# Patient Record
Sex: MALE | Race: WHITE | NOT HISPANIC OR LATINO | Employment: FULL TIME | URBAN - METROPOLITAN AREA
[De-identification: names, ages, dates, MRNs, and addresses within clinical notes are randomized per-mention and may not be internally consistent; named-entity substitution may affect disease eponyms.]

---

## 2023-05-07 DIAGNOSIS — N52.9 ERECTILE DYSFUNCTION, UNSPECIFIED ERECTILE DYSFUNCTION TYPE: Primary | ICD-10-CM

## 2023-05-08 ENCOUNTER — TELEPHONE (OUTPATIENT)
Dept: PRIMARY CARE | Facility: CLINIC | Age: 63
End: 2023-05-08
Payer: COMMERCIAL

## 2023-05-08 RX ORDER — SILDENAFIL 50 MG/1
TABLET, FILM COATED ORAL
Qty: 18 TABLET | Refills: 0 | Status: SHIPPED | OUTPATIENT
Start: 2023-05-08 | End: 2023-07-19 | Stop reason: ALTCHOICE

## 2023-05-08 NOTE — TELEPHONE ENCOUNTER
5/8/23 called left message to call back to setup appt (3m fu) before more meds can be given out.  
5/8/23 pt called back scheduled for CPE + 90 day med fu on 7/19/23 @ 10:00am with Allyson  
Simple: Patient demonstrates quick and easy understanding

## 2023-05-24 ENCOUNTER — HOSPITAL ENCOUNTER (OUTPATIENT)
Dept: DATA CONVERSION | Facility: HOSPITAL | Age: 63
End: 2023-05-24
Attending: SURGERY | Admitting: SURGERY
Payer: COMMERCIAL

## 2023-05-24 DIAGNOSIS — K62.82 DYSPLASIA OF ANUS: ICD-10-CM

## 2023-05-24 DIAGNOSIS — I10 ESSENTIAL (PRIMARY) HYPERTENSION: ICD-10-CM

## 2023-05-24 DIAGNOSIS — I25.10 ATHEROSCLEROTIC HEART DISEASE OF NATIVE CORONARY ARTERY WITHOUT ANGINA PECTORIS: ICD-10-CM

## 2023-05-24 DIAGNOSIS — M54.50 LOW BACK PAIN, UNSPECIFIED: ICD-10-CM

## 2023-05-24 DIAGNOSIS — H54.7 UNSPECIFIED VISUAL LOSS: ICD-10-CM

## 2023-05-24 DIAGNOSIS — D62 ACUTE POSTHEMORRHAGIC ANEMIA: ICD-10-CM

## 2023-05-24 DIAGNOSIS — Z95.3 PRESENCE OF XENOGENIC HEART VALVE: ICD-10-CM

## 2023-05-24 DIAGNOSIS — D68.9 COAGULATION DEFECT, UNSPECIFIED (MULTI): ICD-10-CM

## 2023-05-24 DIAGNOSIS — Z87.891 PERSONAL HISTORY OF NICOTINE DEPENDENCE: ICD-10-CM

## 2023-05-24 DIAGNOSIS — D69.6 THROMBOCYTOPENIA, UNSPECIFIED (CMS-HCC): ICD-10-CM

## 2023-05-24 DIAGNOSIS — E78.5 HYPERLIPIDEMIA, UNSPECIFIED: ICD-10-CM

## 2023-05-24 DIAGNOSIS — R91.8 OTHER NONSPECIFIC ABNORMAL FINDING OF LUNG FIELD: ICD-10-CM

## 2023-05-24 DIAGNOSIS — I31.39 OTHER PERICARDIAL EFFUSION (NONINFLAMMATORY) (HHS-HCC): ICD-10-CM

## 2023-05-24 DIAGNOSIS — D64.9 ANEMIA, UNSPECIFIED: ICD-10-CM

## 2023-05-24 DIAGNOSIS — G89.29 OTHER CHRONIC PAIN: ICD-10-CM

## 2023-05-24 DIAGNOSIS — K76.89 OTHER SPECIFIED DISEASES OF LIVER: ICD-10-CM

## 2023-05-24 DIAGNOSIS — Q23.1 CONGENITAL INSUFFICIENCY OF AORTIC VALVE (HHS-HCC): ICD-10-CM

## 2023-05-24 DIAGNOSIS — K21.9 GASTRO-ESOPHAGEAL REFLUX DISEASE WITHOUT ESOPHAGITIS: ICD-10-CM

## 2023-06-21 LAB
COMPLETE PATHOLOGY REPORT: NORMAL
CONVERTED CLINICAL DIAGNOSIS-HISTORY: NORMAL
CONVERTED FINAL DIAGNOSIS: NORMAL
CONVERTED FINAL REPORT PDF LINK TO COPY AND PASTE: NORMAL
CONVERTED GROSS DESCRIPTION: NORMAL

## 2023-06-28 PROBLEM — R74.8 ELEVATED LIVER ENZYMES: Status: ACTIVE | Noted: 2023-06-28

## 2023-06-28 PROBLEM — I47.10 SUPRAVENTRICULAR TACHYCARDIA (CMS-HCC): Status: ACTIVE | Noted: 2023-06-28

## 2023-06-28 PROBLEM — Z95.828 S/P ASCENDING AORTIC REPLACEMENT: Status: ACTIVE | Noted: 2023-06-28

## 2023-06-28 PROBLEM — K62.9 ANAL LESION: Status: ACTIVE | Noted: 2023-06-28

## 2023-06-28 PROBLEM — K63.5 COLON POLYPS: Status: ACTIVE | Noted: 2023-06-28

## 2023-06-28 PROBLEM — Z95.2 S/P AVR (AORTIC VALVE REPLACEMENT): Status: ACTIVE | Noted: 2023-06-28

## 2023-06-28 PROBLEM — N52.9 ED (ERECTILE DYSFUNCTION): Status: ACTIVE | Noted: 2023-06-28

## 2023-06-28 PROBLEM — K21.9 CHRONIC GERD: Status: ACTIVE | Noted: 2023-06-28

## 2023-06-28 PROBLEM — K59.00 CONSTIPATION: Status: ACTIVE | Noted: 2023-06-28

## 2023-06-28 PROBLEM — R53.83 FATIGUE: Status: ACTIVE | Noted: 2023-06-28

## 2023-06-28 PROBLEM — K76.89 LIVER NODULE: Status: ACTIVE | Noted: 2023-06-28

## 2023-06-28 PROBLEM — R91.8 PULMONARY NODULES: Status: ACTIVE | Noted: 2023-06-28

## 2023-06-28 PROBLEM — R09.82 PND (POST-NASAL DRIP): Status: ACTIVE | Noted: 2023-06-28

## 2023-06-28 PROBLEM — E78.5 DYSLIPIDEMIA: Status: ACTIVE | Noted: 2023-06-28

## 2023-06-28 PROBLEM — M77.52 TENDINITIS OF LEFT FOOT: Status: ACTIVE | Noted: 2023-06-28

## 2023-06-28 PROBLEM — E78.5 HYPERLIPIDEMIA: Status: ACTIVE | Noted: 2023-06-28

## 2023-06-28 PROBLEM — D64.9 ANEMIA: Status: ACTIVE | Noted: 2023-06-28

## 2023-06-28 PROBLEM — G89.18 POST-OP PAIN: Status: ACTIVE | Noted: 2023-06-28

## 2023-06-28 PROBLEM — I10 ESSENTIAL HYPERTENSION: Status: ACTIVE | Noted: 2023-06-28

## 2023-06-28 PROBLEM — R05.8 RECURRENT NON-PRODUCTIVE COUGH: Status: ACTIVE | Noted: 2023-06-28

## 2023-06-28 PROBLEM — M79.672 LEFT FOOT PAIN: Status: ACTIVE | Noted: 2023-06-28

## 2023-06-28 PROBLEM — D17.9 LIPOMA: Status: ACTIVE | Noted: 2023-06-28

## 2023-07-18 ASSESSMENT — PROMIS GLOBAL HEALTH SCALE
RATE_PHYSICAL_HEALTH: EXCELLENT
CARRYOUT_PHYSICAL_ACTIVITIES: COMPLETELY
RATE_AVERAGE_PAIN: 1
RATE_SOCIAL_SATISFACTION: EXCELLENT
CARRYOUT_SOCIAL_ACTIVITIES: EXCELLENT
RATE_MENTAL_HEALTH: EXCELLENT
RATE_QUALITY_OF_LIFE: EXCELLENT
RATE_GENERAL_HEALTH: VERY GOOD
EMOTIONAL_PROBLEMS: NEVER

## 2023-07-19 ENCOUNTER — OFFICE VISIT (OUTPATIENT)
Dept: PRIMARY CARE | Facility: CLINIC | Age: 63
End: 2023-07-19
Payer: COMMERCIAL

## 2023-07-19 VITALS
HEART RATE: 51 BPM | DIASTOLIC BLOOD PRESSURE: 78 MMHG | OXYGEN SATURATION: 98 % | WEIGHT: 155 LBS | BODY MASS INDEX: 24.91 KG/M2 | TEMPERATURE: 97.1 F | RESPIRATION RATE: 15 BRPM | SYSTOLIC BLOOD PRESSURE: 138 MMHG | HEIGHT: 66 IN

## 2023-07-19 DIAGNOSIS — E78.5 HYPERLIPIDEMIA, UNSPECIFIED HYPERLIPIDEMIA TYPE: ICD-10-CM

## 2023-07-19 DIAGNOSIS — Z95.828 S/P ASCENDING AORTIC REPLACEMENT: Chronic | ICD-10-CM

## 2023-07-19 DIAGNOSIS — I10 ESSENTIAL HYPERTENSION: Chronic | ICD-10-CM

## 2023-07-19 DIAGNOSIS — R91.8 PULMONARY NODULES: ICD-10-CM

## 2023-07-19 DIAGNOSIS — N52.9 ERECTILE DYSFUNCTION, UNSPECIFIED ERECTILE DYSFUNCTION TYPE: Primary | ICD-10-CM

## 2023-07-19 DIAGNOSIS — R85.613: ICD-10-CM

## 2023-07-19 DIAGNOSIS — Z95.2 S/P AVR (AORTIC VALVE REPLACEMENT): ICD-10-CM

## 2023-07-19 PROBLEM — R74.8 ELEVATED LIVER ENZYMES: Status: RESOLVED | Noted: 2023-06-28 | Resolved: 2023-07-19

## 2023-07-19 PROBLEM — K21.9 CHRONIC GERD: Status: RESOLVED | Noted: 2023-06-28 | Resolved: 2023-07-19

## 2023-07-19 PROBLEM — D64.9 ANEMIA: Status: RESOLVED | Noted: 2023-06-28 | Resolved: 2023-07-19

## 2023-07-19 PROBLEM — K59.00 CONSTIPATION: Status: RESOLVED | Noted: 2023-06-28 | Resolved: 2023-07-19

## 2023-07-19 PROCEDURE — 3078F DIAST BP <80 MM HG: CPT | Performed by: NURSE PRACTITIONER

## 2023-07-19 PROCEDURE — 1036F TOBACCO NON-USER: CPT | Performed by: NURSE PRACTITIONER

## 2023-07-19 PROCEDURE — 99214 OFFICE O/P EST MOD 30 MIN: CPT | Performed by: NURSE PRACTITIONER

## 2023-07-19 PROCEDURE — 3075F SYST BP GE 130 - 139MM HG: CPT | Performed by: NURSE PRACTITIONER

## 2023-07-19 PROCEDURE — 3008F BODY MASS INDEX DOCD: CPT | Performed by: NURSE PRACTITIONER

## 2023-07-19 RX ORDER — PRAVASTATIN SODIUM 20 MG/1
1 TABLET ORAL DAILY
COMMUNITY
Start: 2022-03-22 | End: 2024-01-19 | Stop reason: SDUPTHER

## 2023-07-19 RX ORDER — BETAMETHASONE DIPROPIONATE 0.5 MG/G
LOTION TOPICAL
COMMUNITY
Start: 2021-10-19

## 2023-07-19 RX ORDER — AMLODIPINE BESYLATE 2.5 MG/1
1 TABLET ORAL DAILY
COMMUNITY
Start: 2021-04-16 | End: 2024-04-05

## 2023-07-19 RX ORDER — SILDENAFIL 100 MG/1
100 TABLET, FILM COATED ORAL AS NEEDED
Qty: 18 TABLET | Refills: 1 | Status: SHIPPED | OUTPATIENT
Start: 2023-07-19 | End: 2024-05-06 | Stop reason: ALTCHOICE

## 2023-07-19 RX ORDER — DOCUSATE SODIUM 100 MG/1
CAPSULE, LIQUID FILLED ORAL 2 TIMES DAILY
COMMUNITY
Start: 2020-02-09 | End: 2023-07-19 | Stop reason: ALTCHOICE

## 2023-07-19 RX ORDER — ATENOLOL 25 MG/1
0.5 TABLET ORAL DAILY
COMMUNITY
Start: 2020-03-04 | End: 2024-04-17

## 2023-07-19 RX ORDER — CHOLECALCIFEROL (VITAMIN D3) 25 MCG
TABLET ORAL
COMMUNITY
Start: 2012-09-07 | End: 2024-01-04 | Stop reason: ALTCHOICE

## 2023-07-19 RX ORDER — SILDENAFIL 50 MG/1
TABLET, FILM COATED ORAL
COMMUNITY
Start: 2020-08-27 | End: 2023-07-19 | Stop reason: SDUPTHER

## 2023-07-19 RX ORDER — LOSARTAN POTASSIUM 25 MG/1
1 TABLET ORAL DAILY
COMMUNITY
Start: 2022-02-11 | End: 2024-05-06 | Stop reason: SDUPTHER

## 2023-07-19 ASSESSMENT — PATIENT HEALTH QUESTIONNAIRE - PHQ9
SUM OF ALL RESPONSES TO PHQ9 QUESTIONS 1 AND 2: 0
2. FEELING DOWN, DEPRESSED OR HOPELESS: NOT AT ALL
1. LITTLE INTEREST OR PLEASURE IN DOING THINGS: NOT AT ALL

## 2023-07-19 ASSESSMENT — PAIN SCALES - GENERAL: PAINLEVEL: 0-NO PAIN

## 2023-07-19 ASSESSMENT — ENCOUNTER SYMPTOMS
OCCASIONAL FEELINGS OF UNSTEADINESS: 0
LOSS OF SENSATION IN FEET: 0
DEPRESSION: 0

## 2023-07-19 ASSESSMENT — COLUMBIA-SUICIDE SEVERITY RATING SCALE - C-SSRS
6. HAVE YOU EVER DONE ANYTHING, STARTED TO DO ANYTHING, OR PREPARED TO DO ANYTHING TO END YOUR LIFE?: NO
2. HAVE YOU ACTUALLY HAD ANY THOUGHTS OF KILLING YOURSELF?: NO
1. IN THE PAST MONTH, HAVE YOU WISHED YOU WERE DEAD OR WISHED YOU COULD GO TO SLEEP AND NOT WAKE UP?: NO

## 2023-07-19 NOTE — ASSESSMENT & PLAN NOTE
controlled. on Losartan, amlodipine and atenolol.   -atenolol cut in half. Bradycardia improved on lower dose.

## 2023-07-19 NOTE — PROGRESS NOTES
"Subjective   Patient ID: Osvaldo Coleman is a 63 y.o. male who presents for No chief complaint on file..    Patient of Dr. Marino.    Had rectal biopsy in May for dysplasia noted on colonoscopy. Biopsy showed HGSIL (AIN-2).   Has upcoming appointment next  with colo-rectal Dr. Flanagan.     Has been checking Bps at home. In the 120-130s/80s           Review of Systems  otherwise negative aside from what was mentioned above in HPI.    Objective   /78 (BP Location: Left arm, Patient Position: Sitting)   Pulse 51   Temp 36.2 °C (97.1 °F)   Resp 15   Ht 1.664 m (5' 5.5\")   Wt 70.3 kg (155 lb)   SpO2 98%   BMI 25.40 kg/m²     Physical Exam    Assessment/Plan   Problem List Items Addressed This Visit          Cardiac and Vasculature    Essential hypertension (Chronic)     controlled. on Losartan, amlodipine and atenolol.   -atenolol cut in half. Bradycardia improved on lower dose.         Hyperlipidemia (Chronic)     Per cardiology.  On pravastatin         S/P ascending aortic replacement (Chronic)     Per cardiology         S/P AVR (aortic valve replacement)       Genitourinary and Reproductive    ED (erectile dysfunction) - Primary (Chronic)     50 mg not helping. 75 mg was working but was not covered to get 50 and 25 mg tabs.   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 Flanagan in colorectal surgery.             Pulmonary and Pneumonias    Pulmonary nodules     Stable; desires annual CT chest; ordered         Relevant Orders    CT chest wo IV contrast           f/u 6 months. Labs     Health Maintenance  -Prostate Cancer Screenin/22: 2.79,   -Vaccinations: UTD influenza. Tdap 19. Shingrix #2 UTD. Pneumovax UTD  -Lung Cancer Screening: never smoker  -Colonoscopy: 3/23--repeat 3 years   "

## 2023-07-19 NOTE — ASSESSMENT & PLAN NOTE
50 mg not helping. 75 mg was working but was not covered to get 50 and 25 mg tabs.   Increase to 100 mg as needed

## 2023-09-07 VITALS
TEMPERATURE: 97.5 F | WEIGHT: 154.98 LBS | RESPIRATION RATE: 18 BRPM | HEIGHT: 67 IN | DIASTOLIC BLOOD PRESSURE: 76 MMHG | BODY MASS INDEX: 24.33 KG/M2 | SYSTOLIC BLOOD PRESSURE: 160 MMHG | HEART RATE: 61 BPM

## 2023-09-30 NOTE — H&P
History of Present Illness:   History Present Illness:  Reason for surgery: Anal dysplasia   HPI:    63M with recent scope showing dysplastic lesions at the anal canal    Allergies:        Allergies:  ·  acetaminophen-chlorpheniramine : Unknown       Intolerances:  ·  Coricidin : Tremor/Shaking    Home Medication Review:   Home Medications Reviewed: yes     Impression/Procedure:   ·  Impression and Planned Procedure: Proceed to OR       ERAS (Enhanced Recovery After Surgery):  ·  ERAS Patient: no       Vital Signs:  Temperature C: 36.4 degrees C   Temperature F: 97.5 degrees F   Heart Rate: 61 beats per minute   Respiratory Rate: 18 breath per minute   Blood Pressure Systolic: 160 mm/Hg   Blood Pressure Diastolic: 76 mm/Hg     Physical Exam by System:    Respiratory/Thorax: Breathing comfortably on room  air   Cardiovascular: Good radial pulses bilaterally, normal  heart rate     Consent:   COVID-19 Consent:  ·  COVID-19 Risk Consent Surgeon has reviewed key risks related to the risk of lynda COVID-19 and if they contract COVID-19 what the risks are.     Attestation:   Note Completion:  I am a:  Resident/Fellow   Attending Attestation I saw and evaluated the patient.  I personally obtained the key and critical portions of the history and physical exam or was physically present for key and  critical portions performed by the resident/fellow. I reviewed the resident/fellow?s documentation and discussed the patient with the resident/fellow.  I agree with the resident/fellow?s medical decision making as documented in the note.     I personally evaluated the patient on 24-May-2023         Electronic Signatures:  Rosa Maria Pozo (Fellow))  (Signed 24-May-2023 08:21)   Authored: History of Present Illness, Allergies, Home  Medication Review, Impression/Procedure, ERAS, Physical Exam, Consent, Note Completion  Sheila Mccain)  (Signed 24-May-2023 09:09)   Authored: Physical Exam, Note  Completion   Co-Signer: History of Present Illness, Allergies, Home Medication Review, Impression/Procedure, ERAS, Physical Exam, Consent, Note Completion      Last Updated: 24-May-2023 09:09 by Sheila Mccain)

## 2023-10-02 NOTE — OP NOTE
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  high resolution anoscopy excision with biopsies  and fulguration of condyloma    Anesthesia: Harpal Goff  Estimated Blood Loss: 0  Findings: condyloma in all quadrants  Specimens(s) Collected: yes,           Operative Report:   Procedure:   Informed consent was obtained including discussion of risks, benefits, and alternatives. The patient was brought to the operating room and placed supine. Sequential compression devices were placed. Huddle was completed in accordance with hospital procedures.  Anesthesia was induced and LMA was placed. The patient was placed in lithotomy with all pressure points padded. The area was prepped and draped in the usual fashion. Time out was completed. Digital rectal exam was performed. Pudendal nerve block was performed  with 1/4% marcaine with epinephrine. Intersphincteric block  was also performed. A total of 30cc of local anesthesia was utilized.    Externally, the perineum was normal.  Lighted Hill-Estrada anoscope was introduced. The anal canal was examined sequentially. The canal itself was divided into octants and in each octant, the distal rectum, dentate, anal canal, and anal verge was examined.  Magnification, acetic acid, and Lugol's were utilized to improve visualization. Areas of abnormality were biopsied and destroyed. There was a 3-4mm cluster in the posterior midline and small 1-2mm lesions in all quadrants.     The area was checked for hemostasis and found to be adequate. All visible lesions were addressed.     The patient was then returned to supine. Anesthesia was weaned. The patient was transferred to PACU awake and in stable conditions. Counts were  reported correct at the end of the procedure. I was present and scrubbed throughout.                            Electronic Signatures:  Sheila Mccain)  (Signed 24-May-2023 09:17)   Authored: Post-Operative Note, Chart Review, Note Completion      Last Updated: 24-May-2023 09:17 by Sheila Mccain)

## 2023-11-20 NOTE — PROGRESS NOTES
"No chief complaint on file.      History Of Present Illness    Subjective   Merlin Coleman \"Gene\" is here for follow up of anal dysplasia. He has not has not noticed any new lesions or changes.     HIV status: negative  Smoking status: never a smoker    Previous High Resolution Anoscopies and Cytologies have shown:     [facility] to IC valve. Diverticulosis in the sigmoid colon. Dysplastic mucosa in the distal rectum. The examination was otherwise normal on direct and retroflexion views. No specimens collected. Repeat in 3 years    5/2023 HRA: HSIL     Past Medical History  He  Past Medical History:   Diagnosis Date    Anemia 06/28/2023    Aneurysm of the ascending aorta, without rupture (CMS/HCC) 02/14/2020    Ascending aortic aneurysm    Chronic GERD 06/28/2023    Constipation 06/28/2023    Elevated liver enzymes 06/28/2023    Other pericardial effusion (noninflammatory) 01/15/2021    Pericardial effusion    Other specified abnormal findings of blood chemistry 09/30/2020    Abnormal LFTs    Personal history of (corrected) congenital malformations of heart and circulatory system 01/07/2020    History of congenital aortic insufficiency    Personal history of (corrected) congenital malformations of heart and circulatory system 01/27/2017    History of bicuspid heart valve    Personal history of (corrected) congenital malformations of heart and circulatory system 01/07/2020    History of bicuspid aortic valve    Personal history of other diseases of the circulatory system 03/25/2020    History of aortic valve stenosis    Postcardiotomy syndrome 05/06/2020    Postpericardiotomy syndrome    Thoracic aortic aneurysm, without rupture, unspecified (CMS/HCC) 01/07/2020    Aneurysm, aorta, thoracic       Surgical History  He  Past Surgical History:   Procedure Laterality Date    OTHER SURGICAL HISTORY  05/31/2019    Colonoscopy    OTHER SURGICAL HISTORY  05/31/2019    Lipoma excision    " OTHER SURGICAL HISTORY  02/21/2020    Thoracic aortic aneurysm repair        Social History  He reports that he has never smoked. He has never used smokeless tobacco. He reports current alcohol use of about 7.0 standard drinks of alcohol per week. He reports that he does not use drugs.    Family History  No family history on file.     Allergies  Chlorpheniramine-acetaminophen and Coricidin    Home Medications  Prior to Admission medications    Medication Sig Start Date End Date Taking? Authorizing Provider   amLODIPine (Norvasc) 2.5 mg tablet Take 1 tablet (2.5 mg) by mouth once daily. 4/16/21   Historical Provider, MD   atenolol (Tenormin) 25 mg tablet Take 0.5 tablets (12.5 mg) by mouth once daily. 3/4/20   Historical Provider, MD   betamethasone dipropionate (Diprosone) 0.05 % lotion APPLY PEA SIZED AMOUNT TOPICALLY TO THE AFFECTED AREA EVERY DAY 10/19/21   Historical Provider, MD   cholecalciferol (Vitamin D-3) 25 MCG (1000 UT) tablet Take by mouth. 9/7/12   Historical Provider, MD   fish oil concentrate (Omega-3) 120-180 mg capsule Take 1 tablet by mouth once daily. 9/7/12   Historical Provider, MD   losartan (Cozaar) 25 mg tablet Take 1 tablet (25 mg) by mouth once daily. 2/11/22   Historical Provider, MD   pravastatin (Pravachol) 20 mg tablet Take 1 tablet (20 mg) by mouth once daily. 3/22/22   Historical Provider, MD   sildenafil (Viagra) 100 mg tablet Take 1 tablet (100 mg) by mouth if needed for erectile dysfunction. 7/19/23 10/17/23  RAFFI March-CNP        Physical Exam    Perineal/CARLOS:  Perineum: normal  CARLOS: normal  Tone: normal    Anoscopy    Date/Time: 12/5/2023 3:07 PM    Performed by: Sheila Mccain MD  Authorized by: Sheila Mccain MD    Consent:     Consent obtained:  Verbal    Consent given by:  Patient  Comments:      No dysplasia          Last Recorded Vitals  There were no vitals taken for this visit.      Assessment and Plan  63 y.o. male here for follow up  of anal dysplasia.     Depending on anal cytology results may recommend HRA. If cytology normal would repeat in 6 months.     I emphasized the significant possibility of recurrence and the need for close follow-up. We discussed barrier protection when sexually active, non-smoking, and taking HIV medications.

## 2023-12-05 ENCOUNTER — OFFICE VISIT (OUTPATIENT)
Dept: SURGERY | Facility: CLINIC | Age: 63
End: 2023-12-05
Payer: COMMERCIAL

## 2023-12-05 VITALS
HEART RATE: 58 BPM | WEIGHT: 157 LBS | DIASTOLIC BLOOD PRESSURE: 82 MMHG | BODY MASS INDEX: 25.73 KG/M2 | SYSTOLIC BLOOD PRESSURE: 138 MMHG

## 2023-12-05 DIAGNOSIS — K62.9 ANAL LESION: Primary | ICD-10-CM

## 2023-12-05 PROCEDURE — 3008F BODY MASS INDEX DOCD: CPT | Performed by: SURGERY

## 2023-12-05 PROCEDURE — 3079F DIAST BP 80-89 MM HG: CPT | Performed by: SURGERY

## 2023-12-05 PROCEDURE — 46600 DIAGNOSTIC ANOSCOPY SPX: CPT | Performed by: SURGERY

## 2023-12-05 PROCEDURE — 1036F TOBACCO NON-USER: CPT | Performed by: SURGERY

## 2023-12-05 PROCEDURE — 99213 OFFICE O/P EST LOW 20 MIN: CPT | Performed by: SURGERY

## 2023-12-05 PROCEDURE — 3075F SYST BP GE 130 - 139MM HG: CPT | Performed by: SURGERY

## 2023-12-05 RX ORDER — HYDROCORTISONE 25 MG/G
CREAM TOPICAL
COMMUNITY
Start: 2022-10-18 | End: 2024-01-19 | Stop reason: ALTCHOICE

## 2023-12-20 ENCOUNTER — APPOINTMENT (OUTPATIENT)
Dept: RADIOLOGY | Facility: CLINIC | Age: 63
End: 2023-12-20
Payer: COMMERCIAL

## 2023-12-29 ENCOUNTER — ANCILLARY PROCEDURE (OUTPATIENT)
Dept: RADIOLOGY | Facility: CLINIC | Age: 63
End: 2023-12-29
Payer: COMMERCIAL

## 2023-12-29 DIAGNOSIS — R91.8 PULMONARY NODULES: ICD-10-CM

## 2023-12-29 PROCEDURE — 71250 CT THORAX DX C-: CPT | Performed by: RADIOLOGY

## 2023-12-29 PROCEDURE — 71250 CT THORAX DX C-: CPT

## 2024-01-03 ENCOUNTER — TELEPHONE (OUTPATIENT)
Dept: PRIMARY CARE | Facility: CLINIC | Age: 64
End: 2024-01-03
Payer: COMMERCIAL

## 2024-01-03 NOTE — TELEPHONE ENCOUNTER
Pt called because her tested positive for COVID today.    He did have a fever (was 101.8) but has been taking Advil.    He is wanting to know if there is anything that you can suggest for him or prescribe him.    Please advise.

## 2024-01-04 ENCOUNTER — TELEMEDICINE (OUTPATIENT)
Dept: PRIMARY CARE | Facility: CLINIC | Age: 64
End: 2024-01-04
Payer: COMMERCIAL

## 2024-01-04 VITALS — SYSTOLIC BLOOD PRESSURE: 145 MMHG | TEMPERATURE: 100.4 F | DIASTOLIC BLOOD PRESSURE: 92 MMHG | HEART RATE: 68 BPM

## 2024-01-04 DIAGNOSIS — U07.1 2019-NCOV DETECTED: Primary | ICD-10-CM

## 2024-01-04 PROCEDURE — 99213 OFFICE O/P EST LOW 20 MIN: CPT | Performed by: INTERNAL MEDICINE

## 2024-01-04 RX ORDER — BENZONATATE 100 MG/1
100 CAPSULE ORAL 3 TIMES DAILY PRN
Qty: 30 CAPSULE | Refills: 0 | Status: SHIPPED | OUTPATIENT
Start: 2024-01-04 | End: 2024-01-19 | Stop reason: ALTCHOICE

## 2024-01-04 ASSESSMENT — PATIENT HEALTH QUESTIONNAIRE - PHQ9
2. FEELING DOWN, DEPRESSED OR HOPELESS: NOT AT ALL
1. LITTLE INTEREST OR PLEASURE IN DOING THINGS: NOT AT ALL
SUM OF ALL RESPONSES TO PHQ9 QUESTIONS 1 AND 2: 0

## 2024-01-04 NOTE — PROGRESS NOTES
Subjective   Patient ID: Osvaldo Coleman is a 63 y.o. male who presents for COVID (Positive yesterday morning, test on Tuesday and was negative.//Symptoms:/Productive light gray cough, sore throat, sinus pressure/pain, chest congestion, lump in throat, fever, stuffy and runny nose, body aches.//Has tried OTC meds and advil. BP safe coracedin.).    HPI     Virtual or Telephone Consent    An interactive audio and video telecommunication system which permits real time communications between the patient (at the originating site) and provider (at the distant site) was utilized to provide this telehealth service.   Verbal consent was requested and obtained from Merlin Coleman on this date, 01/04/24 for a telehealth visit.     Tested positive for COVID  Keeps having fevers and chills is the worst of his symptoms. He states sore throat. Has cough and makes throat feel worse. He started to get sick on Tuesday morning. Wife has COVID as well. Has sinus pressure, chest congestion, lump in throat, stuffy nose and runny nose. Has body aches. Has done coricidin and helps some. Was in [place] and was traveling.      NO GI issues  Tired and achey    Review of Systems   All other systems reviewed and are negative.    Objective   BP (!) 145/92   Pulse 68   Temp 38 °C (100.4 °F)     Physical Exam    No vitals as virtual visit  General: Well developed, nourished, no distress. good eye contact  Respiratory: No respiratory distress.   Psychiatric: Normal affect and mood.    Assessment/Plan   Problem List Items Addressed This Visit    None  Visit Diagnoses         Codes    2019-nCoV detected    -  Primary U07.1    Relevant Medications    nirmatrelvir-ritonavir (PAXLOVID) 300 mg (150 mg x 2)-100 mg tablet therapy pack    benzonatate (Tessalon) 100 mg capsule             COVID-19  -Patient qualifies for paxlovid. Discussed it is approved under emergency authorization act and under investigation so not all side effects are known.  Discussed GI issues and bad taste are common.  There are rare liver and kidney issues. Advised needs to be started in first 5 days and the goal is to help prevent severe COVID symptoms, hospitalizations, and to get better faster. Advised if any issues to call right away. There is a chance of rebound COVID with paxlovid which means you may have resumption of symptoms about 1 week after and you are contagious during this time.    -advised to monitor symptoms and call with worsening cough, SOB, diarrhea, or fevers  -advised to self quarantine at home for at least 5 days if feeling better and no fevers. You will need to mask around others for 10 days after tasting positive. If not feeling better, you will need continue to quarantine.  -tylenol for fevers  - If symptoms fail to improve or worsen, need to follow-up. If severe symptoms develop that warrant immediate medical attention including but not limited to chest pain, shortness of breath, dizziness severe headache, fever that persists and/or greater than 103*F, call office or seek care at local emergency department.   Hold pravastatin x 10 days total  -call if worsening or not better

## 2024-01-19 ENCOUNTER — OFFICE VISIT (OUTPATIENT)
Dept: PRIMARY CARE | Facility: CLINIC | Age: 64
End: 2024-01-19
Payer: COMMERCIAL

## 2024-01-19 VITALS
DIASTOLIC BLOOD PRESSURE: 82 MMHG | BODY MASS INDEX: 26.61 KG/M2 | WEIGHT: 162.4 LBS | TEMPERATURE: 97.5 F | OXYGEN SATURATION: 97 % | SYSTOLIC BLOOD PRESSURE: 118 MMHG | RESPIRATION RATE: 16 BRPM | HEART RATE: 72 BPM

## 2024-01-19 DIAGNOSIS — E78.5 HYPERLIPIDEMIA, UNSPECIFIED HYPERLIPIDEMIA TYPE: Primary | Chronic | ICD-10-CM

## 2024-01-19 DIAGNOSIS — Q23.1 CONGENITAL INSUFFICIENCY OF AORTIC VALVE (HHS-HCC): ICD-10-CM

## 2024-01-19 DIAGNOSIS — I10 ESSENTIAL HYPERTENSION: Chronic | ICD-10-CM

## 2024-01-19 DIAGNOSIS — M79.643 PAIN OF HAND, UNSPECIFIED LATERALITY: ICD-10-CM

## 2024-01-19 DIAGNOSIS — Z12.5 PROSTATE CANCER SCREENING: ICD-10-CM

## 2024-01-19 DIAGNOSIS — Z00.00 ROUTINE ADULT HEALTH MAINTENANCE: ICD-10-CM

## 2024-01-19 DIAGNOSIS — E78.5 DYSLIPIDEMIA: ICD-10-CM

## 2024-01-19 DIAGNOSIS — R53.83 OTHER FATIGUE: ICD-10-CM

## 2024-01-19 PROBLEM — R91.8 LUNG FIELD ABNORMAL: Status: ACTIVE | Noted: 2023-05-24

## 2024-01-19 PROBLEM — I31.39 OTHER PERICARDIAL EFFUSION (NONINFLAMMATORY) (HHS-HCC): Status: ACTIVE | Noted: 2023-05-24

## 2024-01-19 PROBLEM — K76.9 DISORDER OF LIVER: Status: ACTIVE | Noted: 2023-05-24

## 2024-01-19 PROBLEM — I25.10 ATHEROSCLEROSIS OF CORONARY ARTERY: Status: ACTIVE | Noted: 2023-05-24

## 2024-01-19 PROBLEM — F19.21 HISTORY OF SUBSTANCE DEPENDENCE (MULTI): Status: ACTIVE | Noted: 2023-05-24

## 2024-01-19 PROBLEM — D62 ANEMIA DUE TO ACUTE BLOOD LOSS: Status: ACTIVE | Noted: 2023-05-24

## 2024-01-19 PROBLEM — D69.6 DISORDER INVOLVING THROMBOCYTOPENIA (CMS-HCC): Status: ACTIVE | Noted: 2023-05-24

## 2024-01-19 PROBLEM — D62 ANEMIA DUE TO ACUTE BLOOD LOSS: Status: RESOLVED | Noted: 2023-05-24 | Resolved: 2024-01-19

## 2024-01-19 PROBLEM — M54.50 LOW BACK PAIN: Status: ACTIVE | Noted: 2023-05-24

## 2024-01-19 PROBLEM — I35.0 AORTIC VALVE STENOSIS: Status: ACTIVE | Noted: 2023-05-10

## 2024-01-19 PROBLEM — H54.7 VISUAL IMPAIRMENT: Status: ACTIVE | Noted: 2023-05-24

## 2024-01-19 PROBLEM — F19.21 HISTORY OF SUBSTANCE DEPENDENCE (MULTI): Status: RESOLVED | Noted: 2023-05-24 | Resolved: 2024-01-19

## 2024-01-19 PROBLEM — G89.29 CHRONIC PAIN: Status: ACTIVE | Noted: 2023-05-24

## 2024-01-19 PROCEDURE — 1036F TOBACCO NON-USER: CPT | Performed by: INTERNAL MEDICINE

## 2024-01-19 PROCEDURE — 3074F SYST BP LT 130 MM HG: CPT | Performed by: INTERNAL MEDICINE

## 2024-01-19 PROCEDURE — 99215 OFFICE O/P EST HI 40 MIN: CPT | Performed by: INTERNAL MEDICINE

## 2024-01-19 PROCEDURE — 3079F DIAST BP 80-89 MM HG: CPT | Performed by: INTERNAL MEDICINE

## 2024-01-19 PROCEDURE — 3008F BODY MASS INDEX DOCD: CPT | Performed by: INTERNAL MEDICINE

## 2024-01-19 RX ORDER — BISMUTH SUBSALICYLATE 262 MG
1 TABLET,CHEWABLE ORAL DAILY
COMMUNITY

## 2024-01-19 RX ORDER — PRAVASTATIN SODIUM 40 MG/1
40 TABLET ORAL DAILY
Qty: 90 TABLET | Refills: 0 | Status: SHIPPED | OUTPATIENT
Start: 2024-01-19 | End: 2024-03-22 | Stop reason: SDUPTHER

## 2024-01-19 ASSESSMENT — PATIENT HEALTH QUESTIONNAIRE - PHQ9
SUM OF ALL RESPONSES TO PHQ9 QUESTIONS 1 AND 2: 0
1. LITTLE INTEREST OR PLEASURE IN DOING THINGS: NOT AT ALL
2. FEELING DOWN, DEPRESSED OR HOPELESS: NOT AT ALL

## 2024-01-19 ASSESSMENT — ENCOUNTER SYMPTOMS
SHORTNESS OF BREATH: 0
HEMATURIA: 0
PALPITATIONS: 0
DIZZINESS: 0
RHINORRHEA: 0
EYE PAIN: 0
COUGH: 0
WOUND: 0
CONSTIPATION: 0
DYSURIA: 0
FEVER: 0
ARTHRALGIAS: 0
CHEST TIGHTNESS: 0
POLYPHAGIA: 0
MYALGIAS: 0
SORE THROAT: 0
HEADACHES: 0
FREQUENCY: 0
ABDOMINAL PAIN: 0
POLYDIPSIA: 0
DIARRHEA: 0
WHEEZING: 0
NERVOUS/ANXIOUS: 0
NAUSEA: 0
VOMITING: 0
DYSPHORIC MOOD: 1
UNEXPECTED WEIGHT CHANGE: 0
CHILLS: 0
BLOOD IN STOOL: 0

## 2024-01-19 NOTE — PROGRESS NOTES
Subjective   Patient ID: Osvaldo Coleman is a 63 y.o. male who presents for Follow-up (6 month) and Finger Injury (Left hand middle finger).    HPI     Here for follow-up  He had some labs done with [facility]  LDL-120s  BP doing well    He grandson  unexpectedly at 5 months old at  in [place]. Has been hard on the family. States the tragic event has been really hard and tough to process.     He has a little bump on his hand. A little bruise. No formal injury he can think of but tender.  He also has thickened part on finger that he shaves down and unsure why    Has some congestion after COVID but mostly all gone    Review of Systems   Constitutional:  Negative for chills, fever and unexpected weight change.   HENT:  Positive for congestion. Negative for hearing loss, rhinorrhea and sore throat.    Eyes:  Negative for pain and visual disturbance.   Respiratory:  Negative for cough, chest tightness, shortness of breath and wheezing.    Cardiovascular:  Negative for chest pain and palpitations.   Gastrointestinal:  Negative for abdominal pain, blood in stool, constipation, diarrhea, nausea and vomiting.   Endocrine: Negative for cold intolerance, heat intolerance, polydipsia and polyphagia.   Genitourinary:  Negative for dysuria, frequency and hematuria.   Musculoskeletal:  Negative for arthralgias and myalgias.   Skin:  Negative for rash and wound.   Neurological:  Negative for dizziness, syncope and headaches.   Psychiatric/Behavioral:  Positive for dysphoric mood. Negative for suicidal ideas. The patient is not nervous/anxious.        Objective   /82   Pulse 72   Temp 36.4 °C (97.5 °F)   Resp 16   Wt 73.7 kg (162 lb 6.4 oz)   SpO2 97%   BMI 26.61 kg/m²     Physical Exam  Constitutional:       Appearance: Normal appearance.   Cardiovascular:      Rate and Rhythm: Normal rate and regular rhythm.      Heart sounds: Normal heart sounds. No murmur heard.     No gallop.   Pulmonary:      Effort:  Pulmonary effort is normal. No respiratory distress.      Breath sounds: Normal breath sounds.   Musculoskeletal:      Right lower leg: No edema.      Left lower leg: No edema.   Skin:     Comments: Thickened area on skin near index finger  Palmar aspect of hand-small lump noted-pea sized and bruised   Neurological:      Mental Status: He is alert.         Assessment/Plan   Problem List Items Addressed This Visit             ICD-10-CM    Dyslipidemia E78.5    Essential hypertension (Chronic) I10    Fatigue R53.83    Relevant Orders    Vitamin B12    Vitamin D 25-Hydroxy,Total (for eval of Vitamin D levels)    Hyperlipidemia - Primary (Chronic) E78.5    Relevant Medications    pravastatin (Pravachol) 40 mg tablet     Other Visit Diagnoses         Codes    Routine adult health maintenance     Z00.00    Relevant Orders    CBC    Comprehensive Metabolic Panel    Lipid Panel    Prostate cancer screening     Z12.5    Relevant Orders    PSA    Congenital insufficiency of aortic valve     Q23.1    Pain of hand, unspecified laterality     M79.643             Hypertension: controlled. on Losartan, amlodipine and atenolol.     Pulmonary nodules:  -stable nodules 12/23    Liver cyst-radiology states 9cm in size and stable which is different from previous measurement. I messaged Dr. Huntley to look at imaging     Grief- support given     GERD: takes PPI as needed  EGD 10/19: gastritis     ED; fill viagra     Ascending thoracic aneurysm with bicuspid valve: found on calcium score test s/p AVR and AAR  -has pericardial effusion being managed by cards-was on colchicine and indomethacin. No symptoms. Off meds     Hyperlipidemia: on pravastatin, seeing cards  -increase and repeat 3 months     Lipoma: call if gets getting bigger or pain, has benign features     Liver nodule: liver u/s normal 12/19    HGSIL- seeing colorectal every 6 months    Thickened skin on finger-advised to see dermatology    Bump on hand- ? Ganglion cyst vs small  hematoma  -watch and discussed should go away in 4 weeks and if not to call     f/u 6 months for [facility] and labs  I spent 39 minutes with Gene and 10 minutes reviewing chart, messaging radiology and documenting     Health Maintenance  -Prostate Cancer Screenin/19: 2.47, ordered  -Vaccinations: UTD influenza. Tdap 19. Shingrix #2 UTD. Pneumovax UTD  -Lung Cancer Screening: never smoker  -Colonoscopy: 3/23-polyps--repeat 3 years

## 2024-03-11 ENCOUNTER — OFFICE VISIT (OUTPATIENT)
Dept: CARDIOLOGY | Facility: CLINIC | Age: 64
End: 2024-03-11
Payer: MEDICARE

## 2024-03-11 VITALS
DIASTOLIC BLOOD PRESSURE: 66 MMHG | BODY MASS INDEX: 25.74 KG/M2 | HEART RATE: 48 BPM | WEIGHT: 164 LBS | SYSTOLIC BLOOD PRESSURE: 108 MMHG | HEIGHT: 67 IN

## 2024-03-11 DIAGNOSIS — E78.49 OTHER HYPERLIPIDEMIA: Chronic | ICD-10-CM

## 2024-03-11 DIAGNOSIS — I10 ESSENTIAL HYPERTENSION: Primary | Chronic | ICD-10-CM

## 2024-03-11 DIAGNOSIS — Z95.828 S/P ASCENDING AORTIC REPLACEMENT: Chronic | ICD-10-CM

## 2024-03-11 PROCEDURE — 1126F AMNT PAIN NOTED NONE PRSNT: CPT | Performed by: INTERNAL MEDICINE

## 2024-03-11 PROCEDURE — 3008F BODY MASS INDEX DOCD: CPT | Performed by: INTERNAL MEDICINE

## 2024-03-11 PROCEDURE — 3078F DIAST BP <80 MM HG: CPT | Performed by: INTERNAL MEDICINE

## 2024-03-11 PROCEDURE — 1159F MED LIST DOCD IN RCRD: CPT | Performed by: INTERNAL MEDICINE

## 2024-03-11 PROCEDURE — 3074F SYST BP LT 130 MM HG: CPT | Performed by: INTERNAL MEDICINE

## 2024-03-11 PROCEDURE — 1160F RVW MEDS BY RX/DR IN RCRD: CPT | Performed by: INTERNAL MEDICINE

## 2024-03-11 PROCEDURE — 99214 OFFICE O/P EST MOD 30 MIN: CPT | Performed by: INTERNAL MEDICINE

## 2024-03-11 PROCEDURE — 1036F TOBACCO NON-USER: CPT | Performed by: INTERNAL MEDICINE

## 2024-03-11 NOTE — PROGRESS NOTES
"Chief Complaint:   Please see below.     History Of Present Illness:    Merlin Coleman \"Gene\" is a 64 y.o. male presenting with S/P replacement of ascending aorta, hyperlipidemia.    This 64-year-old man is status post prior bioprosthetic AVR and root replacement with a composite graft (number 27 mm Medtronic bovine pericardial valve and 32 mm Gelweave aortic graft) in February 2020 for an ascending aortic aneurysm.     The patient denies chest discomfort, dyspnea, palpitations, orthopnea, PND, syncope, and near syncope.  He walks for 45 minutes five days a week, and lifts weights three days a week.       Last Recorded Vitals:  Vitals:    03/11/24 0700   BP: 108/66   BP Location: Left arm   Patient Position: Sitting   Pulse: (!) 48   Weight: 74.4 kg (164 lb)   Height: 1.702 m (5' 7\")       Past Medical History:  He has a past medical history of Anemia (06/28/2023), Aneurysm of the ascending aorta, without rupture (CMS/HCC) (02/14/2020), Chronic GERD (06/28/2023), Constipation (06/28/2023), Elevated liver enzymes (06/28/2023), Other pericardial effusion (noninflammatory) (01/15/2021), Other specified abnormal findings of blood chemistry (09/30/2020), Personal history of (corrected) congenital malformations of heart and circulatory system (01/07/2020), Personal history of (corrected) congenital malformations of heart and circulatory system (01/27/2017), Personal history of (corrected) congenital malformations of heart and circulatory system (01/07/2020), Personal history of other diseases of the circulatory system (03/25/2020), Postcardiotomy syndrome (05/06/2020), and Thoracic aortic aneurysm, without rupture, unspecified (CMS/HCC) (01/07/2020).    Past Surgical History:  He has a past surgical history that includes Other surgical history (05/31/2019); Other surgical history (05/31/2019); and Other surgical history (02/21/2020).      Social History:  He reports that he has never smoked. He has never used " smokeless tobacco. He reports current alcohol use of about 6.0 standard drinks of alcohol per week. He reports that he does not use drugs.    Family History:  Family History   Problem Relation Name Age of Onset    Hypertension Mother      Stroke Mother          Allergies:  Chlorpheniramine-acetaminophen and Coricidin    Outpatient Medications:  Current Outpatient Medications   Medication Instructions    amLODIPine (Norvasc) 2.5 mg tablet 1 tablet, oral, Daily    atenolol (Tenormin) 25 mg tablet 0.5 tablets, oral, Daily    betamethasone dipropionate (Diprosone) 0.05 % lotion APPLY PEA SIZED AMOUNT TOPICALLY TO THE AFFECTED AREA EVERY DAY    fish oil concentrate (Omega-3) 120-180 mg capsule 1 tablet, oral, Daily    losartan (Cozaar) 25 mg tablet 1 tablet, oral, Daily    multivitamin tablet 1 tablet, oral, Daily    pravastatin (PRAVACHOL) 40 mg, oral, Daily    sildenafil (VIAGRA) 100 mg, oral, As needed       Physical Exam:  GENERAL:  pleasant 64 year-old  HEENT: No xanthelasma  NECK: Supple, no palpable adenopathy or thyromegaly  CHEST: Clear to auscultation, respiratory effort unlabored  CARDIAC: RRR, normal S1 and S2, no audible  rub, gallop, carotids are brisk, PMI is not displaced; there is a 2/6 systolic murmur at the RSB  ABD: Active bowel sounds, nontender, no organomegaly, no evidence of ascites  EXT: No clubbing, cyanosis, edema, or tenderness  NEURO: Awake, alert, appropriate, speech is fluent       Last Labs:  CBC -  Lab Results   Component Value Date    WBC 5.3 05/10/2023    HGB 13.8 05/10/2023    HCT 41.0 05/10/2023    MCV 89 05/10/2023     05/10/2023       CMP -  Lab Results   Component Value Date    CALCIUM 9.7 05/10/2023    PHOS 3.5 02/09/2020    PROT 7.0 09/29/2022    ALBUMIN 4.4 09/29/2022    AST 19 09/29/2022    ALT 17 09/29/2022    ALKPHOS 41 09/29/2022    BILITOT 0.6 09/29/2022       LIPID PANEL -   Lab Results   Component Value Date    CHOL 202 (H) 09/29/2022    TRIG 78 09/29/2022    HDL  60.0 09/29/2022    CHHDL 3.4 09/29/2022    LDLF 126 (H) 09/29/2022    VLDL 16 09/29/2022       RENAL FUNCTION PANEL -   Lab Results   Component Value Date    GLUCOSE 82 05/10/2023     05/10/2023    K 4.3 05/10/2023     05/10/2023    CO2 26 05/10/2023    ANIONGAP 13 05/10/2023    BUN 18 05/10/2023    CREATININE 0.94 05/10/2023    GFRMALE 90 05/10/2023    CALCIUM 9.7 05/10/2023    PHOS 3.5 02/09/2020    ALBUMIN 4.4 09/29/2022        Lab Results   Component Value Date    HGBA1C 4.9 01/21/2020         Lab review: I have Chemistry CMP:   Lab Results   Component Value Date    ALBUMIN 4.4 09/29/2022    CALCIUM 9.7 05/10/2023    CO2 26 05/10/2023    CREATININE 0.94 05/10/2023    GLUCOSE 82 05/10/2023    BILITOT 0.6 09/29/2022    PROT 7.0 09/29/2022    ALT 17 09/29/2022    AST 19 09/29/2022    ALKPHOS 41 09/29/2022   , Chemistry BMP   Lab Results   Component Value Date    GLUCOSE 82 05/10/2023    CALCIUM 9.7 05/10/2023    CO2 26 05/10/2023    CREATININE 0.94 05/10/2023   , CBC:  Lab Results   Component Value Date    WBC 5.3 05/10/2023    RBC 4.63 05/10/2023    HGB 13.8 05/10/2023    HCT 41.0 05/10/2023    MCV 89 05/10/2023    MCHC 33.7 05/10/2023    RDW 14.4 05/10/2023    NRBC 0.0 05/10/2023   , and Lipids:   Lab Results   Component Value Date    CHOL 202 (H) 09/29/2022    HDL 60.0 09/29/2022    TRIG 78 09/29/2022       Assessment/Plan   Problem List Items Addressed This Visit          Cardiac and Vasculature    Essential hypertension - Primary (Chronic)    Hyperlipidemia (Chronic)    S/P ascending aortic replacement (Chronic)     HTN:  BP is well controlled.   We discussed sodium restriction, lifestyle modification, and the DASH diet.  I advised the patient to check BPs at home daily, and to contact me with an update in one month.  Advised the patient to focus on diet, exercise, and weight loss, according to the guidelines of the American Heart Association.  I reminded the patient of the importance of  endocarditis prophylaxis prior to appropriate procedures.     Franco Weinberg MD

## 2024-03-11 NOTE — PATIENT INSTRUCTIONS

## 2024-03-21 DIAGNOSIS — E78.5 HYPERLIPIDEMIA, UNSPECIFIED HYPERLIPIDEMIA TYPE: Chronic | ICD-10-CM

## 2024-03-22 RX ORDER — PRAVASTATIN SODIUM 40 MG/1
40 TABLET ORAL DAILY
Qty: 90 TABLET | Refills: 3 | Status: SHIPPED | OUTPATIENT
Start: 2024-03-22 | End: 2024-05-06 | Stop reason: SDUPTHER

## 2024-04-04 DIAGNOSIS — I10 ESSENTIAL HYPERTENSION: Primary | ICD-10-CM

## 2024-04-05 RX ORDER — AMLODIPINE BESYLATE 2.5 MG/1
2.5 TABLET ORAL DAILY
Qty: 90 TABLET | Refills: 3 | Status: SHIPPED | OUTPATIENT
Start: 2024-04-05 | End: 2024-05-06 | Stop reason: SDUPTHER

## 2024-04-17 DIAGNOSIS — Z95.828 S/P ASCENDING AORTIC REPLACEMENT: Primary | Chronic | ICD-10-CM

## 2024-04-17 RX ORDER — ATENOLOL 25 MG/1
12.5 TABLET ORAL DAILY
Qty: 45 TABLET | Refills: 3 | Status: SHIPPED | OUTPATIENT
Start: 2024-04-17 | End: 2024-05-06 | Stop reason: SDUPTHER

## 2024-05-03 ENCOUNTER — PATIENT MESSAGE (OUTPATIENT)
Dept: PRIMARY CARE | Facility: CLINIC | Age: 64
End: 2024-05-03
Payer: MEDICARE

## 2024-05-03 DIAGNOSIS — E78.5 HYPERLIPIDEMIA, UNSPECIFIED HYPERLIPIDEMIA TYPE: Chronic | ICD-10-CM

## 2024-05-03 DIAGNOSIS — Z95.828 S/P ASCENDING AORTIC REPLACEMENT: Chronic | ICD-10-CM

## 2024-05-03 DIAGNOSIS — N52.9 ERECTILE DYSFUNCTION, UNSPECIFIED ERECTILE DYSFUNCTION TYPE: Primary | Chronic | ICD-10-CM

## 2024-05-03 DIAGNOSIS — I10 ESSENTIAL HYPERTENSION: ICD-10-CM

## 2024-05-06 RX ORDER — LOSARTAN POTASSIUM 25 MG/1
25 TABLET ORAL DAILY
Qty: 90 TABLET | Refills: 3 | Status: SHIPPED | OUTPATIENT
Start: 2024-05-06

## 2024-05-06 RX ORDER — TADALAFIL 10 MG/1
10 TABLET ORAL DAILY PRN
Qty: 12 TABLET | Refills: 0 | Status: SHIPPED | OUTPATIENT
Start: 2024-05-06 | End: 2025-05-06

## 2024-05-06 RX ORDER — ATENOLOL 25 MG/1
12.5 TABLET ORAL DAILY
Qty: 45 TABLET | Refills: 3 | Status: SHIPPED | OUTPATIENT
Start: 2024-05-06

## 2024-05-06 RX ORDER — AMLODIPINE BESYLATE 2.5 MG/1
2.5 TABLET ORAL DAILY
Qty: 90 TABLET | Refills: 3 | Status: SHIPPED | OUTPATIENT
Start: 2024-05-06

## 2024-05-06 RX ORDER — PRAVASTATIN SODIUM 40 MG/1
40 TABLET ORAL DAILY
Qty: 90 TABLET | Refills: 3 | Status: SHIPPED | OUTPATIENT
Start: 2024-05-06

## 2024-06-17 NOTE — PROGRESS NOTES
"History Of Present Illness  Merlin Coleman \"Gene\" is a 64 y.o. male who has a hx of anal dysplasia AIN-2.     Colonoscopy 3/20/2023 ([facility]): Complete to IC valve. Diverticulosis in the sigmoid colon. Dysplastic mucosa in the distal rectum. The examination was otherwise normal on direct and retroflexion views. No specimens collected. Repeat in 3 years    Overall doing fine. No changes in health. Wife retired.      5/2023 HRA: HSIL      Past Medical History  He has a past medical history of Anemia (06/28/2023), Aneurysm of the ascending aorta, without rupture (CMS-HCC) (02/14/2020), Chronic GERD (06/28/2023), Constipation (06/28/2023), Elevated liver enzymes (06/28/2023), Other pericardial effusion (noninflammatory) ([facility]) (01/15/2021), Other specified abnormal findings of blood chemistry (09/30/2020), Personal history of (corrected) congenital malformations of heart and circulatory system (01/07/2020), Personal history of (corrected) congenital malformations of heart and circulatory system (01/27/2017), Personal history of (corrected) congenital malformations of heart and circulatory system (01/07/2020), Personal history of other diseases of the circulatory system (03/25/2020), Postcardiotomy syndrome (05/06/2020), and Thoracic aortic aneurysm, without rupture, unspecified (CMS-HCC) (01/07/2020).    Surgical History  He has a past surgical history that includes Other surgical history (05/31/2019); Other surgical history (05/31/2019); and Other surgical history (02/21/2020).     Social History  He reports that he has never smoked. He has never used smokeless tobacco. He reports current alcohol use of about 6.0 standard drinks of alcohol per week. He reports that he does not use drugs.    Family History  Family History   Problem Relation Name Age of Onset    Hypertension Mother      Stroke Mother          Allergies  Chlorpheniramine-acetaminophen and Coricidin    Review of Systems     Physical " Exam  NAD  Perineum normal  CARLOS normal    Anoscopy    Date/Time: 6/18/2024 1:33 PM    Performed by: Sheila Mccain MD  Authorized by: Sheila Mccain MD    Consent:     Consent obtained:  Verbal    Consent given by:  Patient  Post-procedure details:     Procedure completion:  Tolerated well, no immediate complications  Comments:      Findings: normal             Last Recorded Vitals  There were no vitals taken for this visit.    Relevant Results             Assessment/Plan   Normal exam today. Follow up 6 months if cytology normal or HRA sooner if dysplasia present.

## 2024-06-18 ENCOUNTER — OFFICE VISIT (OUTPATIENT)
Dept: SURGERY | Facility: CLINIC | Age: 64
End: 2024-06-18
Payer: MEDICARE

## 2024-06-18 VITALS
SYSTOLIC BLOOD PRESSURE: 151 MMHG | BODY MASS INDEX: 24.12 KG/M2 | HEART RATE: 106 BPM | DIASTOLIC BLOOD PRESSURE: 90 MMHG | WEIGHT: 154 LBS

## 2024-06-18 DIAGNOSIS — K62.9 ANAL LESION: Primary | ICD-10-CM

## 2024-06-18 DIAGNOSIS — R53.83 OTHER FATIGUE: ICD-10-CM

## 2024-06-18 PROCEDURE — 46600 DIAGNOSTIC ANOSCOPY SPX: CPT | Performed by: SURGERY

## 2024-06-18 PROCEDURE — 99213 OFFICE O/P EST LOW 20 MIN: CPT | Mod: 24 | Performed by: SURGERY

## 2024-06-20 LAB
LABORATORY COMMENT REPORT: NORMAL
PATH REPORT.FINAL DX SPEC: NORMAL
PATH REPORT.GROSS SPEC: NORMAL
PATH REPORT.RELEVANT HX SPEC: NORMAL
PATH REPORT.TOTAL CANCER: NORMAL

## 2024-06-21 ENCOUNTER — TELEPHONE (OUTPATIENT)
Dept: SURGERY | Facility: CLINIC | Age: 64
End: 2024-06-21
Payer: MEDICARE

## 2024-11-05 ENCOUNTER — PATIENT MESSAGE (OUTPATIENT)
Dept: PRIMARY CARE | Facility: CLINIC | Age: 64
End: 2024-11-05
Payer: MEDICARE

## 2024-11-05 DIAGNOSIS — R53.83 OTHER FATIGUE: ICD-10-CM

## 2024-11-05 DIAGNOSIS — Z12.5 PROSTATE CANCER SCREENING: ICD-10-CM

## 2024-11-05 DIAGNOSIS — N52.9 ERECTILE DYSFUNCTION, UNSPECIFIED ERECTILE DYSFUNCTION TYPE: Chronic | ICD-10-CM

## 2024-11-05 DIAGNOSIS — E78.5 DYSLIPIDEMIA: ICD-10-CM

## 2024-11-05 DIAGNOSIS — I10 ESSENTIAL HYPERTENSION: Primary | ICD-10-CM

## 2024-11-05 RX ORDER — TADALAFIL 20 MG/1
20 TABLET ORAL DAILY PRN
Qty: 12 TABLET | Refills: 0 | Status: SHIPPED | OUTPATIENT
Start: 2024-11-05 | End: 2025-11-05

## 2024-11-11 ENCOUNTER — LAB (OUTPATIENT)
Dept: LAB | Facility: LAB | Age: 64
End: 2024-11-11
Payer: MEDICARE

## 2024-11-11 DIAGNOSIS — R53.83 OTHER FATIGUE: ICD-10-CM

## 2024-11-11 DIAGNOSIS — Z12.5 PROSTATE CANCER SCREENING: ICD-10-CM

## 2024-11-11 DIAGNOSIS — I10 ESSENTIAL HYPERTENSION: ICD-10-CM

## 2024-11-11 DIAGNOSIS — E78.5 DYSLIPIDEMIA: ICD-10-CM

## 2024-11-11 LAB
ALBUMIN SERPL BCP-MCNC: 4.2 G/DL (ref 3.4–5)
ALP SERPL-CCNC: 46 U/L (ref 33–136)
ALT SERPL W P-5'-P-CCNC: 21 U/L (ref 10–52)
ANION GAP SERPL CALC-SCNC: 13 MMOL/L (ref 10–20)
AST SERPL W P-5'-P-CCNC: 16 U/L (ref 9–39)
BILIRUB SERPL-MCNC: 0.6 MG/DL (ref 0–1.2)
BUN SERPL-MCNC: 17 MG/DL (ref 6–23)
CALCIUM SERPL-MCNC: 9.6 MG/DL (ref 8.6–10.6)
CHLORIDE SERPL-SCNC: 104 MMOL/L (ref 98–107)
CHOLEST SERPL-MCNC: 182 MG/DL (ref 0–199)
CHOLESTEROL/HDL RATIO: 3.4
CO2 SERPL-SCNC: 29 MMOL/L (ref 21–32)
CREAT SERPL-MCNC: 1.11 MG/DL (ref 0.5–1.3)
EGFRCR SERPLBLD CKD-EPI 2021: 74 ML/MIN/1.73M*2
ERYTHROCYTE [DISTWIDTH] IN BLOOD BY AUTOMATED COUNT: 13.8 % (ref 11.5–14.5)
GLUCOSE SERPL-MCNC: 95 MG/DL (ref 74–99)
HCT VFR BLD AUTO: 41.8 % (ref 41–52)
HDLC SERPL-MCNC: 53.9 MG/DL
HGB BLD-MCNC: 14.5 G/DL (ref 13.5–17.5)
LDLC SERPL CALC-MCNC: 109 MG/DL
MCH RBC QN AUTO: 30.7 PG (ref 26–34)
MCHC RBC AUTO-ENTMCNC: 34.7 G/DL (ref 32–36)
MCV RBC AUTO: 88 FL (ref 80–100)
NON HDL CHOLESTEROL: 128 MG/DL (ref 0–149)
NRBC BLD-RTO: 0 /100 WBCS (ref 0–0)
PLATELET # BLD AUTO: 284 X10*3/UL (ref 150–450)
POTASSIUM SERPL-SCNC: 4.5 MMOL/L (ref 3.5–5.3)
PROT SERPL-MCNC: 6.7 G/DL (ref 6.4–8.2)
PSA SERPL-MCNC: 2.59 NG/ML
RBC # BLD AUTO: 4.73 X10*6/UL (ref 4.5–5.9)
SODIUM SERPL-SCNC: 141 MMOL/L (ref 136–145)
TRIGL SERPL-MCNC: 98 MG/DL (ref 0–149)
VIT B12 SERPL-MCNC: 789 PG/ML (ref 211–911)
VLDL: 20 MG/DL (ref 0–40)
WBC # BLD AUTO: 5.5 X10*3/UL (ref 4.4–11.3)

## 2024-11-11 PROCEDURE — 36415 COLL VENOUS BLD VENIPUNCTURE: CPT

## 2024-11-11 PROCEDURE — G0103 PSA SCREENING: HCPCS

## 2024-11-11 PROCEDURE — 80053 COMPREHEN METABOLIC PANEL: CPT

## 2024-11-11 PROCEDURE — 82607 VITAMIN B-12: CPT

## 2024-11-11 PROCEDURE — 85027 COMPLETE CBC AUTOMATED: CPT

## 2024-11-11 PROCEDURE — 80061 LIPID PANEL: CPT

## 2024-11-12 ENCOUNTER — PATIENT MESSAGE (OUTPATIENT)
Dept: PRIMARY CARE | Facility: CLINIC | Age: 64
End: 2024-11-12
Payer: MEDICARE

## 2024-11-12 DIAGNOSIS — R91.8 PULMONARY NODULES: Primary | ICD-10-CM

## 2024-12-12 ENCOUNTER — HOSPITAL ENCOUNTER (OUTPATIENT)
Dept: RADIOLOGY | Facility: CLINIC | Age: 64
Discharge: HOME | End: 2024-12-12
Payer: MEDICARE

## 2024-12-12 DIAGNOSIS — R91.8 PULMONARY NODULES: ICD-10-CM

## 2024-12-12 PROCEDURE — 71250 CT THORAX DX C-: CPT

## 2024-12-12 PROCEDURE — 71250 CT THORAX DX C-: CPT | Performed by: RADIOLOGY

## 2025-01-07 ENCOUNTER — APPOINTMENT (OUTPATIENT)
Dept: SURGERY | Facility: CLINIC | Age: 65
End: 2025-01-07
Payer: MEDICARE

## 2025-01-21 ENCOUNTER — APPOINTMENT (OUTPATIENT)
Dept: SURGERY | Facility: CLINIC | Age: 65
End: 2025-01-21
Payer: MEDICARE

## 2025-02-27 ENCOUNTER — APPOINTMENT (OUTPATIENT)
Dept: PRIMARY CARE | Facility: CLINIC | Age: 65
End: 2025-02-27
Payer: MEDICARE

## 2025-02-27 VITALS
HEIGHT: 67 IN | BODY MASS INDEX: 27.03 KG/M2 | DIASTOLIC BLOOD PRESSURE: 78 MMHG | HEART RATE: 68 BPM | OXYGEN SATURATION: 98 % | WEIGHT: 172.2 LBS | SYSTOLIC BLOOD PRESSURE: 120 MMHG

## 2025-02-27 DIAGNOSIS — Z00.00 ROUTINE GENERAL MEDICAL EXAMINATION AT HEALTH CARE FACILITY: Primary | ICD-10-CM

## 2025-02-27 DIAGNOSIS — R91.8 PULMONARY NODULES: ICD-10-CM

## 2025-02-27 DIAGNOSIS — I10 ESSENTIAL HYPERTENSION: ICD-10-CM

## 2025-02-27 DIAGNOSIS — R73.09 ELEVATED GLUCOSE: ICD-10-CM

## 2025-02-27 DIAGNOSIS — N52.9 ERECTILE DYSFUNCTION, UNSPECIFIED ERECTILE DYSFUNCTION TYPE: Chronic | ICD-10-CM

## 2025-02-27 DIAGNOSIS — Z00.00 ROUTINE ADULT HEALTH MAINTENANCE: ICD-10-CM

## 2025-02-27 PROCEDURE — 99214 OFFICE O/P EST MOD 30 MIN: CPT | Performed by: INTERNAL MEDICINE

## 2025-02-27 PROCEDURE — G0009 ADMIN PNEUMOCOCCAL VACCINE: HCPCS | Performed by: INTERNAL MEDICINE

## 2025-02-27 PROCEDURE — 3008F BODY MASS INDEX DOCD: CPT | Performed by: INTERNAL MEDICINE

## 2025-02-27 PROCEDURE — 3078F DIAST BP <80 MM HG: CPT | Performed by: INTERNAL MEDICINE

## 2025-02-27 PROCEDURE — 1036F TOBACCO NON-USER: CPT | Performed by: INTERNAL MEDICINE

## 2025-02-27 PROCEDURE — G0438 PPPS, INITIAL VISIT: HCPCS | Performed by: INTERNAL MEDICINE

## 2025-02-27 PROCEDURE — 1123F ACP DISCUSS/DSCN MKR DOCD: CPT | Performed by: INTERNAL MEDICINE

## 2025-02-27 PROCEDURE — 1170F FXNL STATUS ASSESSED: CPT | Performed by: INTERNAL MEDICINE

## 2025-02-27 PROCEDURE — 1160F RVW MEDS BY RX/DR IN RCRD: CPT | Performed by: INTERNAL MEDICINE

## 2025-02-27 PROCEDURE — 1159F MED LIST DOCD IN RCRD: CPT | Performed by: INTERNAL MEDICINE

## 2025-02-27 PROCEDURE — 3074F SYST BP LT 130 MM HG: CPT | Performed by: INTERNAL MEDICINE

## 2025-02-27 PROCEDURE — 90677 PCV20 VACCINE IM: CPT | Performed by: INTERNAL MEDICINE

## 2025-02-27 RX ORDER — KETOCONAZOLE 20 MG/ML
SHAMPOO, SUSPENSION TOPICAL
COMMUNITY
Start: 2024-09-19

## 2025-02-27 RX ORDER — TADALAFIL 20 MG/1
20 TABLET ORAL DAILY PRN
Qty: 18 TABLET | Refills: 3 | Status: SHIPPED | OUTPATIENT
Start: 2025-02-27 | End: 2026-02-27

## 2025-02-27 RX ORDER — CLOBETASOL PROPIONATE 0.5 MG/ML
SOLUTION TOPICAL
COMMUNITY
Start: 2025-01-06

## 2025-02-27 RX ORDER — ROSUVASTATIN CALCIUM 20 MG/1
20 TABLET, COATED ORAL NIGHTLY
Qty: 90 TABLET | Refills: 3 | Status: SHIPPED | OUTPATIENT
Start: 2025-02-27 | End: 2026-04-03

## 2025-02-27 RX ORDER — LOSARTAN POTASSIUM 50 MG/1
50 TABLET ORAL DAILY
Qty: 90 TABLET | Refills: 3 | Status: SHIPPED | OUTPATIENT
Start: 2025-02-27

## 2025-02-27 ASSESSMENT — ENCOUNTER SYMPTOMS
HEMATURIA: 0
CONSTIPATION: 0
WOUND: 0
HEADACHES: 0
WHEEZING: 0
POLYPHAGIA: 0
NERVOUS/ANXIOUS: 0
MYALGIAS: 0
POLYDIPSIA: 0
DIARRHEA: 0
SORE THROAT: 0
BLOOD IN STOOL: 0
UNEXPECTED WEIGHT CHANGE: 0
FREQUENCY: 0
ABDOMINAL PAIN: 0
VOMITING: 0
RHINORRHEA: 0
DIZZINESS: 0
FEVER: 0
SHORTNESS OF BREATH: 0
CHEST TIGHTNESS: 0
EYE PAIN: 0
DYSURIA: 0
PALPITATIONS: 0
CHILLS: 0
DYSPHORIC MOOD: 0
COUGH: 0
NAUSEA: 0
ARTHRALGIAS: 0
BACK PAIN: 1

## 2025-02-27 ASSESSMENT — VISUAL ACUITY
OD_CC: 20/15
OS_CC: 20/25

## 2025-02-27 ASSESSMENT — ACTIVITIES OF DAILY LIVING (ADL)
TAKING_MEDICATION: INDEPENDENT
DRESSING: INDEPENDENT
MANAGING_FINANCES: INDEPENDENT
GROCERY_SHOPPING: INDEPENDENT
BATHING: INDEPENDENT
DOING_HOUSEWORK: INDEPENDENT

## 2025-02-27 ASSESSMENT — PATIENT HEALTH QUESTIONNAIRE - PHQ9
1. LITTLE INTEREST OR PLEASURE IN DOING THINGS: NOT AT ALL
SUM OF ALL RESPONSES TO PHQ9 QUESTIONS 1 AND 2: 0
2. FEELING DOWN, DEPRESSED OR HOPELESS: NOT AT ALL

## 2025-02-27 NOTE — PROGRESS NOTES
"Subjective   Reason for Visit: Merlin Coelman is an 65 y.o. male here for a Medicare Wellness visit.     Past Medical, Surgical, and Family History reviewed and updated in chart.    Reviewed all medications by prescribing practitioner or clinical pharmacist (such as prescriptions, OTCs, herbal therapies and supplements) and documented in the medical record.    HPI    Here for annual  Brought BP log  Did CT chest and labs    BP log at home shows elevated readings    Did better with cholesterol on crestor    He states has postnasal drip since winter. States lots of mucus    No flank pain  Does get back pain    Patient Care Team:  Rosa Maria Marino MD as PCP - General  Rosa Maria Marino MD as PCP - O Medicare Advantage PCP  Franco Weinberg MD as Consulting Physician (Cardiology)     Review of Systems   Constitutional:  Negative for chills, fever and unexpected weight change.   HENT:  Positive for postnasal drip. Negative for congestion, hearing loss, rhinorrhea and sore throat.    Eyes:  Negative for pain and visual disturbance.   Respiratory:  Negative for cough, chest tightness, shortness of breath and wheezing.    Cardiovascular:  Negative for chest pain and palpitations.   Gastrointestinal:  Negative for abdominal pain, blood in stool, constipation, diarrhea, nausea and vomiting.   Endocrine: Negative for cold intolerance, heat intolerance, polydipsia and polyphagia.   Genitourinary:  Negative for dysuria, frequency and hematuria.   Musculoskeletal:  Positive for back pain. Negative for arthralgias and myalgias.   Skin:  Negative for rash and wound.   Neurological:  Negative for dizziness, syncope and headaches.   Psychiatric/Behavioral:  Negative for dysphoric mood. The patient is not nervous/anxious.        Objective   Vitals:  /78 (BP Location: Left arm, Patient Position: Sitting, BP Cuff Size: Adult)   Pulse 68   Ht 1.702 m (5' 7\")   Wt 78.1 kg (172 lb 3.2 oz)   SpO2 98%   BMI 26.97 kg/m²   "     Physical Exam  Vitals reviewed.   Constitutional:       Appearance: Normal appearance. He is not ill-appearing.   HENT:      Head: Normocephalic and atraumatic.      Right Ear: Tympanic membrane normal.      Left Ear: Tympanic membrane normal.      Nose: Nose normal.      Mouth/Throat:      Mouth: Mucous membranes are moist.      Pharynx: Oropharynx is clear.   Eyes:      Extraocular Movements: Extraocular movements intact.      Conjunctiva/sclera: Conjunctivae normal.      Pupils: Pupils are equal, round, and reactive to light.   Cardiovascular:      Rate and Rhythm: Normal rate and regular rhythm.   Pulmonary:      Effort: Pulmonary effort is normal.      Breath sounds: Normal breath sounds. No wheezing.   Abdominal:      General: There is no distension.      Palpations: Abdomen is soft. There is no mass.      Tenderness: There is no abdominal tenderness.   Musculoskeletal:      Cervical back: Neck supple.      Right lower leg: No edema.      Left lower leg: No edema.   Lymphadenopathy:      Cervical: No cervical adenopathy.   Neurological:      General: No focal deficit present.      Mental Status: He is alert and oriented to person, place, and time.      Gait: Gait normal.   Psychiatric:         Mood and Affect: Mood normal.         Behavior: Behavior normal.         Thought Content: Thought content normal.         Assessment & Plan  Essential hypertension    Orders:    losartan (Cozaar) 50 mg tablet; Take 1 tablet (50 mg) by mouth once daily.    rosuvastatin (Crestor) 20 mg tablet; Take 1 tablet (20 mg) by mouth once daily at bedtime.    Lipid Panel; Future    Routine adult health maintenance    Orders:    Pneumococcal conjugate vaccine, 20-valent (PREVNAR 20)    Routine general medical examination at health care facility    Orders:    1 Year Follow Up In [facility] - PCP - Wellness Exam; Future    Elevated glucose    Orders:    Hemoglobin A1C; Future    Pulmonary nodules    Orders:    CT chest  wo IV contrast; Future    Erectile dysfunction, unspecified erectile dysfunction type    Orders:    tadalafil (Cialis) 20 mg tablet; Take 1 tablet (20 mg) by mouth once daily as needed for erectile dysfunction.         CPE completed.  Advised to keep a heart healthy, low fat  diet with fruits and veggies like Mediterranean diet.  Advised on the importance of exercise and maintaining 150 minutes of exercise per week (30 minutes per day 5 days a week).  Advised on regular eye and dental visits.  Discussed age appropriate cancer screening, immunizations and recommendations given.  Discussed avoiding illicit drugs and tobacco. Advised on appropriate use of alcohol.  Advised to wear seat belt.    PND- flonase daily      Hypertension: controlled. on Losartan, amlodipine and atenolol.   -increase Losartan to 50 mg     Pulmonary nodules:  -stable nodules 12/24- stable-repeat 1 year     GERD: takes PPI as needed  EGD 10/19: gastritis     ED; fill viagra     Ascending thoracic aneurysm with bicuspid valve: found on calcium score test s/p AVR and AAR  -has pericardial effusion being managed by cards-was on colchicine and indomethacin. No symptoms. Off meds     Hyperlipidemia: swap to crestor as better control on this  -repeat labs 3 months     Lipoma: call if gets getting bigger or pain, has benign features     Liver nodule: liver u/s normal 12/19     HGSIL- seeing colorectal every 6 months     f/u 6 months with labs before    Spent 43 minutes in room with "[employer]  -Prostate Cancer Screening: wnl  -Vaccinations: UTD influenza. Tdap 11/26/19. Shingrix #2 UTD. Pneumovax UTD. Prevar-20 today. Advised RSV  -Lung Cancer Screening: never smoker  -Colonoscopy: 3/23-polyps--repeat 3 years

## 2025-02-27 NOTE — ASSESSMENT & PLAN NOTE
Orders:    losartan (Cozaar) 50 mg tablet; Take 1 tablet (50 mg) by mouth once daily.    rosuvastatin (Crestor) 20 mg tablet; Take 1 tablet (20 mg) by mouth once daily at bedtime.    Lipid Panel; Future

## 2025-03-03 NOTE — PROGRESS NOTES
"No chief complaint on file.      History Of Present Illness    Subjective   Merlin Coleman \"Gene\" is here for follow up of anal dysplasia.  he  has not noticed any new lesions or changes.   Had what sounds like an acute thrombosed hemorrhoid in September, now completely resolved. No obvious trigger.   Previous High Resolution Anoscopies and Cytologies have shown:     [facility] to IC valve. Diverticulosis in the sigmoid colon. Dysplastic mucosa in the distal rectum. The examination was otherwise normal on direct and retroflexion views. No specimens collected. Repeat in 3 years.     5/2023 HRA: HSIL  6/2024 PAP: Negative     Past Medical History  He  Past Medical History:   Diagnosis Date    Anemia 06/28/2023    Aneurysm of the ascending aorta, without rupture (CMS-HCC) 02/14/2020    Ascending aortic aneurysm    Chronic GERD 06/28/2023    Constipation 06/28/2023    Elevated liver enzymes 06/28/2023    Other pericardial effusion (noninflammatory) ([facility]) 01/15/2021    Pericardial effusion    Other specified abnormal findings of blood chemistry 09/30/2020    Abnormal LFTs    Personal history of (corrected) congenital malformations of heart and circulatory system 01/07/2020    History of congenital aortic insufficiency    Personal history of (corrected) congenital malformations of heart and circulatory system 01/27/2017    History of bicuspid heart valve    Personal history of (corrected) congenital malformations of heart and circulatory system 01/07/2020    History of bicuspid aortic valve    Personal history of other diseases of the circulatory system 03/25/2020    History of aortic valve stenosis    Postcardiotomy syndrome 05/06/2020    Postpericardiotomy syndrome    Thoracic aortic aneurysm, without rupture, unspecified (CMS-HCC) 01/07/2020    Aneurysm, aorta, thoracic       Surgical History  He  Past Surgical History:   Procedure Laterality Date    OTHER SURGICAL HISTORY  " 05/31/2019    Colonoscopy    OTHER SURGICAL HISTORY  05/31/2019    Lipoma excision    OTHER SURGICAL HISTORY  02/21/2020    Thoracic aortic aneurysm repair        Social History  He reports that he has never smoked. He has never used smokeless tobacco. He reports current alcohol use of about 6.0 standard drinks of alcohol per week. He reports that he does not use drugs.    Family History  Family History   Problem Relation Name Age of Onset    Hypertension Mother      Stroke Mother          Allergies  Chlorpheniramine-acetaminophen and Coricidin    Home Medications  Prior to Admission medications    Medication Sig Start Date End Date Taking? Authorizing Provider   amLODIPine (Norvasc) 2.5 mg tablet Take 1 tablet (2.5 mg) by mouth once daily. 5/6/24   Rosa Maria Marino MD   atenolol (Tenormin) 25 mg tablet Take 0.5 tablets (12.5 mg) by mouth once daily. 5/6/24   Rosa Maria Marino MD   betamethasone dipropionate (Diprosone) 0.05 % lotion APPLY PEA SIZED AMOUNT TOPICALLY TO THE AFFECTED AREA EVERY DAY 10/19/21   Historical Provider, MD   clobetasol (Temovate) 0.05 % external solution  1/6/25   Historical Provider, MD   fish oil concentrate (Omega-3) 120-180 mg capsule Take 1 tablet by mouth once daily. 9/7/12   Historical Provider, MD   ketoconazole (NIZOral) 2 % shampoo  9/19/24   Historical Provider, MD   losartan (Cozaar) 50 mg tablet Take 1 tablet (50 mg) by mouth once daily. 2/27/25   Rosa Maria Marino MD   multivitamin tablet Take 1 tablet by mouth once daily.    Historical Provider, MD   rosuvastatin (Crestor) 20 mg tablet Take 1 tablet (20 mg) by mouth once daily at bedtime. 2/27/25 4/3/26  Rosa Maria Marino MD   tadalafil (Cialis) 20 mg tablet Take 1 tablet (20 mg) by mouth once daily as needed for erectile dysfunction. 2/27/25 2/27/26  Rosa Maria Marino MD   losartan (Cozaar) 25 mg tablet Take 1 tablet (25 mg) by mouth once daily. 5/6/24 2/27/25  Rosa Maria Marino MD   pravastatin (Pravachol) 40 mg tablet Take 1 tablet (40 mg) by  mouth once daily. 5/6/24 2/27/25  Rosa Maria Marino MD   tadalafil (Cialis) 20 mg tablet Take 1 tablet (20 mg) by mouth once daily as needed for erectile dysfunction. 11/5/24 2/27/25  Rosa Maria Marino MD       Review of Systems   Constitutional:  Negative for activity change, chills, fatigue and unexpected weight change.   All other systems reviewed and are negative.       Physical Exam  Vitals reviewed.   Constitutional:       Appearance: Normal appearance. He is normal weight.   Pulmonary:      Effort: Pulmonary effort is normal.   Neurological:      Mental Status: He is alert.         Perineal/CARLOS:  Perineum: Normal  CARLOS: Normal  Tone: Normal    Anoscopy    Date/Time: 3/4/2025 2:55 PM    Performed by: Sheila Mccain MD  Authorized by: Sheila Mccain MD    Consent:     Consent obtained:  Verbal    Consent given by:  Patient    Risks, benefits, and alternatives were discussed: yes      Risks discussed:  Bleeding and pain    Alternatives discussed:  No treatment  Universal protocol:     Procedure explained and questions answered to patient or proxy's satisfaction: yes      Patient identity confirmed:  Verbally with patient  Indications:     Indications comment:  Anal dysplasia  Post-procedure details:     Procedure completion:  Tolerated well, no immediate complications      Last Recorded Vitals  There were no vitals taken for this visit.      Assessment and Plan  65 y.o. male here for follow up of anal dysplasia.     Normal exam today. Will follow up with clinical examination in 6 months.

## 2025-03-04 ENCOUNTER — OFFICE VISIT (OUTPATIENT)
Dept: SURGERY | Facility: CLINIC | Age: 65
End: 2025-03-04
Payer: MEDICARE

## 2025-03-04 VITALS
DIASTOLIC BLOOD PRESSURE: 80 MMHG | WEIGHT: 174 LBS | SYSTOLIC BLOOD PRESSURE: 142 MMHG | BODY MASS INDEX: 27.25 KG/M2 | HEART RATE: 65 BPM

## 2025-03-04 DIAGNOSIS — K62.9 ANAL LESION: Primary | ICD-10-CM

## 2025-03-04 PROCEDURE — 1123F ACP DISCUSS/DSCN MKR DOCD: CPT | Performed by: SURGERY

## 2025-03-04 PROCEDURE — 46600 DIAGNOSTIC ANOSCOPY SPX: CPT | Performed by: SURGERY

## 2025-03-04 PROCEDURE — 99213 OFFICE O/P EST LOW 20 MIN: CPT | Performed by: SURGERY

## 2025-03-04 PROCEDURE — 3079F DIAST BP 80-89 MM HG: CPT | Performed by: SURGERY

## 2025-03-04 PROCEDURE — 1159F MED LIST DOCD IN RCRD: CPT | Performed by: SURGERY

## 2025-03-04 PROCEDURE — 99213 OFFICE O/P EST LOW 20 MIN: CPT | Mod: 24 | Performed by: SURGERY

## 2025-03-04 PROCEDURE — 3077F SYST BP >= 140 MM HG: CPT | Performed by: SURGERY

## 2025-03-04 ASSESSMENT — ENCOUNTER SYMPTOMS
ACTIVITY CHANGE: 0
FATIGUE: 0
UNEXPECTED WEIGHT CHANGE: 0
CHILLS: 0

## 2025-03-10 ENCOUNTER — APPOINTMENT (OUTPATIENT)
Dept: OPHTHALMOLOGY | Facility: CLINIC | Age: 65
End: 2025-03-10
Payer: MEDICARE

## 2025-03-10 DIAGNOSIS — D23.111 PAPILLOMA OF RIGHT UPPER EYELID: Primary | ICD-10-CM

## 2025-03-10 DIAGNOSIS — D23.121: ICD-10-CM

## 2025-03-10 PROCEDURE — 99202 OFFICE O/P NEW SF 15 MIN: CPT | Performed by: OPHTHALMOLOGY

## 2025-03-10 RX ORDER — FLUTICASONE FUROATE 27.5 UG/1
2 SPRAY, METERED NASAL
COMMUNITY

## 2025-03-10 ASSESSMENT — VISUAL ACUITY
OD_CC: 20/25-
METHOD: SNELLEN - LINEAR
OS_CC: 20/25-
CORRECTION_TYPE: GLASSES

## 2025-03-10 ASSESSMENT — EXTERNAL EXAM - RIGHT EYE: OD_EXAM: NORMAL

## 2025-03-10 ASSESSMENT — ENCOUNTER SYMPTOMS
ENDOCRINE NEGATIVE: 0
EYES NEGATIVE: 1
MUSCULOSKELETAL NEGATIVE: 0
PSYCHIATRIC NEGATIVE: 0
CARDIOVASCULAR NEGATIVE: 0
HEMATOLOGIC/LYMPHATIC NEGATIVE: 0
ALLERGIC/IMMUNOLOGIC NEGATIVE: 0
NEUROLOGICAL NEGATIVE: 0
RESPIRATORY NEGATIVE: 0
GASTROINTESTINAL NEGATIVE: 0
CONSTITUTIONAL NEGATIVE: 0

## 2025-03-10 ASSESSMENT — EXTERNAL EXAM - LEFT EYE: OS_EXAM: NORMAL

## 2025-03-10 NOTE — PROGRESS NOTES
Assessment/Plan   Diagnoses and all orders for this visit:  Papilloma of right upper eyelid  Papilloma of skin of left upper eyelid  Dr. Hogue Next available

## 2025-03-14 ENCOUNTER — APPOINTMENT (OUTPATIENT)
Dept: CARDIOLOGY | Facility: CLINIC | Age: 65
End: 2025-03-14
Payer: MEDICARE

## 2025-03-20 ENCOUNTER — APPOINTMENT (OUTPATIENT)
Dept: OPHTHALMOLOGY | Facility: CLINIC | Age: 65
End: 2025-03-20
Payer: MEDICARE

## 2025-03-24 ENCOUNTER — APPOINTMENT (OUTPATIENT)
Dept: CARDIOLOGY | Facility: CLINIC | Age: 65
End: 2025-03-24
Payer: MEDICARE

## 2025-03-24 VITALS
OXYGEN SATURATION: 97 % | BODY MASS INDEX: 26.78 KG/M2 | HEART RATE: 72 BPM | SYSTOLIC BLOOD PRESSURE: 120 MMHG | HEIGHT: 67 IN | WEIGHT: 170.6 LBS | DIASTOLIC BLOOD PRESSURE: 74 MMHG

## 2025-03-24 DIAGNOSIS — I10 ESSENTIAL HYPERTENSION: Chronic | ICD-10-CM

## 2025-03-24 DIAGNOSIS — Z95.2 S/P AVR: Primary | ICD-10-CM

## 2025-03-24 DIAGNOSIS — E78.49 OTHER HYPERLIPIDEMIA: Chronic | ICD-10-CM

## 2025-03-24 DIAGNOSIS — Z95.828 S/P ASCENDING AORTIC REPLACEMENT: Chronic | ICD-10-CM

## 2025-03-24 PROCEDURE — 1160F RVW MEDS BY RX/DR IN RCRD: CPT | Performed by: INTERNAL MEDICINE

## 2025-03-24 PROCEDURE — 3008F BODY MASS INDEX DOCD: CPT | Performed by: INTERNAL MEDICINE

## 2025-03-24 PROCEDURE — 1036F TOBACCO NON-USER: CPT | Performed by: INTERNAL MEDICINE

## 2025-03-24 PROCEDURE — G2211 COMPLEX E/M VISIT ADD ON: HCPCS | Performed by: INTERNAL MEDICINE

## 2025-03-24 PROCEDURE — 1123F ACP DISCUSS/DSCN MKR DOCD: CPT | Performed by: INTERNAL MEDICINE

## 2025-03-24 PROCEDURE — 3078F DIAST BP <80 MM HG: CPT | Performed by: INTERNAL MEDICINE

## 2025-03-24 PROCEDURE — 3074F SYST BP LT 130 MM HG: CPT | Performed by: INTERNAL MEDICINE

## 2025-03-24 PROCEDURE — 1159F MED LIST DOCD IN RCRD: CPT | Performed by: INTERNAL MEDICINE

## 2025-03-24 PROCEDURE — 99214 OFFICE O/P EST MOD 30 MIN: CPT | Performed by: INTERNAL MEDICINE

## 2025-03-24 NOTE — PATIENT INSTRUCTIONS

## 2025-03-24 NOTE — ASSESSMENT & PLAN NOTE
Agree with switch to rosuvastatin.  Orders:    Follow Up In Cardiology    Follow Up In Cardiology; Future

## 2025-03-24 NOTE — PROGRESS NOTES
"Chief Complaint:   Please See Below     History Of Present Illness:    Merlin Coleman \"Gene\" is a 65 y.o. male presenting with S/P bioprosthetic AVR, and replacement of ascending aorta, hyperlipidemia .    This 65-year-old man is status post prior bioprosthetic AVR and root replacement with a composite graft (27 mm Medtronic bovine pericardial valve and 32 mm Gelweave aortic graft) in February 2020 for an ascending aortic aneurysm.     He has had episodes of dizziness for the past two months or so.  His primary care physician increased his losartan recently, but the dizziness predated the dosage change.  He does not drink more than perhaps 20 ounces of noncaffeinated, nonalcoholic beverages daily, and takes some combination of his atenolol, losartan, and amlodipine simultaneously.  The patient denies chest discomfort, dyspnea, palpitations, orthopnea, PND, syncope, and near syncope.    Now with the weather warming up, he is back to his walking routine going for about an hour a day at a steady pace.  He feels well when he walks.     Last Recorded Vitals:  Vitals:    03/24/25 1553   BP: 120/74   BP Location: Left arm   Patient Position: Sitting   Pulse: 72   SpO2: 97%   Weight: 77.4 kg (170 lb 9.6 oz)   Height: 1.702 m (5' 7\")       Past Medical History:  He has a past medical history of Anemia (06/28/2023), Aneurysm of the ascending aorta, without rupture (CMS-HCC) (02/14/2020), Chronic GERD (06/28/2023), Constipation (06/28/2023), Elevated liver enzymes (06/28/2023), Other pericardial effusion (noninflammatory) ([facility]) (01/15/2021), Other specified abnormal findings of blood chemistry (09/30/2020), Personal history of (corrected) congenital malformations of heart and circulatory system (01/07/2020), Personal history of (corrected) congenital malformations of heart and circulatory system (01/27/2017), Personal history of (corrected) congenital malformations of heart and circulatory system (01/07/2020), " Personal history of other diseases of the circulatory system (03/25/2020), Postcardiotomy syndrome (05/06/2020), and Thoracic aortic aneurysm, without rupture, unspecified (CMS-HCC) (01/07/2020).    Past Surgical History:  He has a past surgical history that includes Other surgical history (05/31/2019); Other surgical history (05/31/2019); and Other surgical history (02/21/2020).      Social History:  He reports that he has never smoked. He has never used smokeless tobacco. He reports current alcohol use of about 6.0 standard drinks of alcohol per week. He reports that he does not use drugs.    Family History:  Family History   Problem Relation Name Age of Onset    Hypertension Mother      Stroke Mother          Allergies:  Chlorpheniramine-acetaminophen and Coricidin    Outpatient Medications:  Current Outpatient Medications   Medication Instructions    amLODIPine (NORVASC) 2.5 mg, oral, Daily    atenolol (TENORMIN) 12.5 mg, oral, Daily    betamethasone dipropionate (Diprosone) 0.05 % lotion APPLY PEA SIZED AMOUNT TOPICALLY TO THE AFFECTED AREA EVERY DAY    clobetasol (Temovate) 0.05 % external solution     fish oil concentrate (Omega-3) 120-180 mg capsule 1 tablet, Daily    fluticasone (Flonase Sensimist) 27.5 mcg/actuation nasal spray 2 sprays, Daily RT    ketoconazole (NIZOral) 2 % shampoo     losartan (COZAAR) 50 mg, oral, Daily    multivitamin tablet 1 tablet, Daily    rosuvastatin (CRESTOR) 20 mg, oral, Nightly    tadalafil 20 mg, oral, Daily PRN       Physical Exam:  GENERAL:  pleasant 65 year-old  HEENT: No xanthelasma  NECK: Supple, no palpable adenopathy or thyromegaly  CHEST: Clear to auscultation, respiratory effort unlabored  CARDIAC: RRR, normal S1 and S2, no audible  rub, gallop, carotids are brisk, PMI is not displaced; there is a 2/6 systolic murmur heard best at the RSB.  ABD: Active bowel sounds, nontender, no organomegaly, no evidence of ascites  EXT: No clubbing, cyanosis, edema, or  tenderness  NEURO: Awake, alert, appropriate, speech is fluent       Last Labs:  CBC -  Lab Results   Component Value Date    WBC 5.5 11/11/2024    HGB 14.5 11/11/2024    HCT 41.8 11/11/2024    MCV 88 11/11/2024     11/11/2024       CMP -  Lab Results   Component Value Date    CALCIUM 9.6 11/11/2024    PHOS 3.5 02/09/2020    PROT 6.7 11/11/2024    ALBUMIN 4.2 11/11/2024    AST 16 11/11/2024    ALT 21 11/11/2024    ALKPHOS 46 11/11/2024    BILITOT 0.6 11/11/2024       LIPID PANEL -   Lab Results   Component Value Date    CHOL 182 11/11/2024    TRIG 98 11/11/2024    HDL 53.9 11/11/2024    CHHDL 3.4 11/11/2024    LDLF 126 (H) 09/29/2022    VLDL 20 11/11/2024    NHDL 128 11/11/2024       RENAL FUNCTION PANEL -   Lab Results   Component Value Date    GLUCOSE 95 11/11/2024     11/11/2024    K 4.5 11/11/2024     11/11/2024    CO2 29 11/11/2024    ANIONGAP 13 11/11/2024    BUN 17 11/11/2024    CREATININE 1.11 11/11/2024    GFRMALE 90 05/10/2023    CALCIUM 9.6 11/11/2024    PHOS 3.5 02/09/2020    ALBUMIN 4.2 11/11/2024        Lab Results   Component Value Date    HGBA1C 4.9 01/21/2020         Lab review: I have Chemistry CMP:   Lab Results   Component Value Date    ALBUMIN 4.2 11/11/2024    CALCIUM 9.6 11/11/2024    CO2 29 11/11/2024    CREATININE 1.11 11/11/2024    GLUCOSE 95 11/11/2024    BILITOT 0.6 11/11/2024    PROT 6.7 11/11/2024    ALT 21 11/11/2024    AST 16 11/11/2024    ALKPHOS 46 11/11/2024   , Chemistry BMP   Lab Results   Component Value Date    GLUCOSE 95 11/11/2024    CALCIUM 9.6 11/11/2024    CO2 29 11/11/2024    CREATININE 1.11 11/11/2024   , CBC:  Lab Results   Component Value Date    WBC 5.5 11/11/2024    RBC 4.73 11/11/2024    HGB 14.5 11/11/2024    HCT 41.8 11/11/2024    MCV 88 11/11/2024    MCH 30.7 11/11/2024    MCHC 34.7 11/11/2024    RDW 13.8 11/11/2024    NRBC 0.0 11/11/2024   , and Lipids:   Lab Results   Component Value Date    CHOL 182 11/11/2024    HDL 53.9 11/11/2024     LDLCALC 109 (H) 11/11/2024    TRIG 98 11/11/2024       Assessment/Plan   Assessment & Plan  Essential hypertension  HTN:  BP is well controlled.   We discussed sodium restriction, lifestyle modification, and the DASH diet.  I advised the patient to check BPs at home daily, and to contact me with an update in one month.    I advised him to separate his atenolol, losartan, and amlodipine rather than taking some combination of these medicines simultaneously.  He understands and agrees.  I also advised him to increase his intake of noncaffeinated, nonalcoholic beverages to at least 64 ounces daily.  Orders:    Follow Up In Cardiology    Follow Up In Cardiology; Future    Other hyperlipidemia  Agree with switch to rosuvastatin.  Orders:    Follow Up In Cardiology    Follow Up In Cardiology; Future    S/P ascending aortic replacement    Orders:    Follow Up In Cardiology    Follow Up In Cardiology; Future    S/P AVR  I reminded the patient of the importance of endocarditis prophylaxis prior to appropriate procedures.    Orders:    Transthoracic Echo Complete; Future    Follow Up In Cardiology; Future          Franco Weinberg MD

## 2025-04-25 ENCOUNTER — HOSPITAL ENCOUNTER (OUTPATIENT)
Dept: CARDIOLOGY | Facility: HOSPITAL | Age: 65
Discharge: HOME | End: 2025-04-25
Payer: MEDICARE

## 2025-04-25 DIAGNOSIS — Z95.2 S/P AVR: ICD-10-CM

## 2025-04-25 LAB
AORTIC VALVE MEAN GRADIENT: 16 MMHG
AORTIC VALVE PEAK VELOCITY: 2.79 M/S
AV PEAK GRADIENT: 31 MMHG
AVA (PEAK VEL): 1.17 CM2
AVA (VTI): 1.46 CM2
EJECTION FRACTION APICAL 4 CHAMBER: 42
EJECTION FRACTION: 65 %
LEFT ATRIUM VOLUME AREA LENGTH INDEX BSA: 32.1 ML/M2
LEFT VENTRICLE INTERNAL DIMENSION DIASTOLE: 5.33 CM (ref 3.5–6)
LEFT VENTRICULAR OUTFLOW TRACT DIAMETER: 2.03 CM
LV EJECTION FRACTION BIPLANE: 65 %
MITRAL VALVE E/A RATIO: 1.47
RIGHT VENTRICLE FREE WALL PEAK S': 12.94 CM/S
RIGHT VENTRICLE PEAK SYSTOLIC PRESSURE: 31.2 MMHG
TRICUSPID ANNULAR PLANE SYSTOLIC EXCURSION: 1.8 CM

## 2025-04-25 PROCEDURE — 93306 TTE W/DOPPLER COMPLETE: CPT

## 2025-04-25 PROCEDURE — 93306 TTE W/DOPPLER COMPLETE: CPT | Performed by: INTERNAL MEDICINE

## 2025-04-29 ENCOUNTER — APPOINTMENT (OUTPATIENT)
Dept: OPHTHALMOLOGY | Facility: CLINIC | Age: 65
End: 2025-04-29
Payer: MEDICARE

## 2025-04-29 DIAGNOSIS — H05.402 ENOPHTHALMOS OF LEFT EYE: ICD-10-CM

## 2025-04-29 DIAGNOSIS — D48.5 NEOPLASM OF UNCERTAIN BEHAVIOR OF SKIN OF EYELID: Primary | ICD-10-CM

## 2025-04-29 DIAGNOSIS — H53.2 DIPLOPIA: ICD-10-CM

## 2025-04-29 PROCEDURE — 88305 TISSUE EXAM BY PATHOLOGIST: CPT | Performed by: PATHOLOGY

## 2025-04-29 PROCEDURE — 99214 OFFICE O/P EST MOD 30 MIN: CPT

## 2025-04-29 PROCEDURE — 0753T DGTZ GLS MCRSCP SLD LEVEL IV: CPT | Mod: TC,SUR,ELYLAB

## 2025-04-29 RX ORDER — ERYTHROMYCIN 5 MG/G
OINTMENT OPHTHALMIC
Qty: 3.5 G | Refills: 1 | Status: SHIPPED | OUTPATIENT
Start: 2025-04-29

## 2025-04-29 ASSESSMENT — VISUAL ACUITY
OD_CC: 20/25
OS_CC: 20/25
METHOD: SNELLEN - LINEAR

## 2025-04-29 ASSESSMENT — TONOMETRY
IOP_METHOD: GOLDMANN APPLANATION
OD_IOP_MMHG: 16
OS_IOP_MMHG: 16

## 2025-04-29 ASSESSMENT — ENCOUNTER SYMPTOMS: EYES NEGATIVE: 1

## 2025-04-29 NOTE — PROGRESS NOTES
Lesion RUL lateral inferior to brow, measures 2.4 mm  Increased in size over past year  Initially flat, but changed to papillomatous over past year    RUL medial lesion, measures 1.4mm  Present x several years  Some increase in size over past few years  Overlies canaliculus    BAILEY central margin 3  Present since grade school  No significant changes over time    Assessment/Plan  Lesion on the RIGHT UPPER eyelid x 2 and of the LEFT UPPER eyelid x 1. Pt not concerned about medial RUL lesion and BAILEY lesion and not interested in excision after r/b/a discussion, which focused on lacrimal system damage and margin notching for each lesion, respectively. Thus, discussion was focused on LATERAL RUL lesion. DDx includes benign cystic lesion, xanthelasma, syringoma, nevus, seborrheic keratosis, verruca, acrochordon, molluscum, chalazion, or malignancy. Given growth, appearance, and symptoms, recommended excisional biopsy. Patient is aware that there is no guarantee of result. Risks of recurrence, scarring, bleeding, infection, lash loss, and notching explained. Patient expressed understanding and wishes to proceed with the recommended surgery. Informed consent obtained and photos taken (see Media tab or below). See procedure note below.    Procedure performed successfully in office today and tolerated well with no complications. Postprocedural instructions and return precautions reviewed. Pt will RTC in 1-2 weeks for reassessment and review of pathology. Sooner with any concerns or acute changes.      Incidentally noted to have ocular misalignment on exam, with supraduction and abduction deficits OS. No evidence of optic neuropathy. VA good, IOP good, CP full, PERRL no APD. 6RHT and 2LET in primary gaze. Feb onset of binocular diplopia. No hx of trauma. No hx of strab or patching.     MRI IBIS ordered for eval of orbit and brainstem given diplopia.                       Procedure Note  The risks, benefits, and alternatives of the  procedure were discussed in detail with the patient. Patient is aware that there is no guarantee of result. Patient wishes to proceed. Consent was obtained. Proparacaine drops were instilled. The patient was prepped in usual fashion. A scleral shell was placed over the eye.     The RIGHT upper lid was injected with 2% lidocaine with epinephrine, resulting in good anesthesia. The lid lesion was excised using Rodney scissors parallel to the long axis of the lid. There was no tarsal involvement and no notching. Minimal cautery was needed. Antibiotic ointment was applied. The eyelids were pressure patched for 2 hours. Patient warned to be careful with ambulation while patch is on. Use ointment twice daily for 2 weeks. All questions answered. Patient tolerated the procedure well with no complications.

## 2025-04-29 NOTE — PATIENT INSTRUCTIONS
"Your Doctor Has Ordered a CT or MRI Scan  The imaging department will contact you to schedule the appointment. However, you may also contact them directly at [phone] to schedule your appointment. Please have your scan done before your next appointment with us. If you have your scan done outside of a  facility, please ask for the \"disk\" or \"USB\" and bring that to your next appointment.   "

## 2025-05-04 ASSESSMENT — EXTERNAL EXAM - LEFT EYE: OS_EXAM: NORMAL

## 2025-05-04 ASSESSMENT — EXTERNAL EXAM - RIGHT EYE: OD_EXAM: NORMAL

## 2025-05-07 DIAGNOSIS — Z95.828 S/P ASCENDING AORTIC REPLACEMENT: Chronic | ICD-10-CM

## 2025-05-07 RX ORDER — ATENOLOL 25 MG/1
12.5 TABLET ORAL DAILY
Qty: 45 TABLET | Refills: 3 | Status: SHIPPED | OUTPATIENT
Start: 2025-05-07

## 2025-05-12 ENCOUNTER — APPOINTMENT (OUTPATIENT)
Dept: CARDIOLOGY | Facility: CLINIC | Age: 65
End: 2025-05-12
Payer: MEDICARE

## 2025-05-14 ENCOUNTER — APPOINTMENT (OUTPATIENT)
Dept: CARDIOLOGY | Facility: CLINIC | Age: 65
End: 2025-05-14
Payer: MEDICARE

## 2025-05-14 VITALS
SYSTOLIC BLOOD PRESSURE: 126 MMHG | HEIGHT: 67 IN | HEART RATE: 52 BPM | BODY MASS INDEX: 25.27 KG/M2 | WEIGHT: 161 LBS | DIASTOLIC BLOOD PRESSURE: 76 MMHG

## 2025-05-14 DIAGNOSIS — Z95.2 S/P AVR: ICD-10-CM

## 2025-05-14 DIAGNOSIS — Z95.828 S/P ASCENDING AORTIC REPLACEMENT: Chronic | ICD-10-CM

## 2025-05-14 DIAGNOSIS — E78.49 OTHER HYPERLIPIDEMIA: Chronic | ICD-10-CM

## 2025-05-14 DIAGNOSIS — I10 ESSENTIAL HYPERTENSION: Chronic | ICD-10-CM

## 2025-05-14 DIAGNOSIS — I47.10 SUPRAVENTRICULAR TACHYCARDIA: ICD-10-CM

## 2025-05-14 DIAGNOSIS — Z95.2 S/P AVR (AORTIC VALVE REPLACEMENT): Primary | ICD-10-CM

## 2025-05-14 PROCEDURE — 1036F TOBACCO NON-USER: CPT | Performed by: INTERNAL MEDICINE

## 2025-05-14 PROCEDURE — 1159F MED LIST DOCD IN RCRD: CPT | Performed by: INTERNAL MEDICINE

## 2025-05-14 PROCEDURE — 3008F BODY MASS INDEX DOCD: CPT | Performed by: INTERNAL MEDICINE

## 2025-05-14 PROCEDURE — G2211 COMPLEX E/M VISIT ADD ON: HCPCS | Performed by: INTERNAL MEDICINE

## 2025-05-14 PROCEDURE — 1160F RVW MEDS BY RX/DR IN RCRD: CPT | Performed by: INTERNAL MEDICINE

## 2025-05-14 PROCEDURE — 99214 OFFICE O/P EST MOD 30 MIN: CPT | Performed by: INTERNAL MEDICINE

## 2025-05-14 PROCEDURE — 3078F DIAST BP <80 MM HG: CPT | Performed by: INTERNAL MEDICINE

## 2025-05-14 PROCEDURE — 3074F SYST BP LT 130 MM HG: CPT | Performed by: INTERNAL MEDICINE

## 2025-05-14 NOTE — PATIENT INSTRUCTIONS

## 2025-05-14 NOTE — PROGRESS NOTES
"Chief Complaint:   Please see below.     History Of Present Illness:    Merlin Coleman \"Gene\" is a 65 y.o. male presenting with S/P bioprosthetic AVR, and replacement of ascending aorta, hyperlipidemia .    This 65-year-old man is status post prior bioprosthetic AVR and root replacement with a composite graft (27 mm Medtronic bovine pericardial valve and 32 mm Gelweave aortic graft) in February 2020 for an ascending aortic aneurysm. The patient's echocardiogram dated 4/25/2025  revealed an EF of 65%, with a normally functioning bioprosthetic AVR and composite graft.  Vmax across the valve was 2.79 m/s, with a dimensionless index of 0.45.      Four days a week, he walks on the treadmill going 4.25 miles at a 4 mph pace.  He feels well when he exercises.  The patient denies chest discomfort, dyspnea, palpitations, orthopnea, PND, syncope, and near syncope.       Last Recorded Vitals:  Vitals:    05/14/25 1500   BP: 126/76   BP Location: Left arm   Patient Position: Sitting   Pulse: 52   Weight: 73 kg (161 lb)   Height: 1.702 m (5' 7\")       Past Medical History:  He has a past medical history of Anemia (06/28/2023), Aneurysm of the ascending aorta, without rupture (02/14/2020), Cataract, Chronic GERD (06/28/2023), Constipation (06/28/2023), Elevated liver enzymes (06/28/2023), Other pericardial effusion (noninflammatory) ([facility]) (01/15/2021), Other specified abnormal findings of blood chemistry (09/30/2020), Personal history of (corrected) congenital malformations of heart and circulatory system (01/07/2020), Personal history of (corrected) congenital malformations of heart and circulatory system (01/27/2017), Personal history of (corrected) congenital malformations of heart and circulatory system (01/07/2020), Personal history of other diseases of the circulatory system (03/25/2020), Postcardiotomy syndrome (05/06/2020), and Thoracic aortic aneurysm, without rupture, unspecified (01/07/2020).    Past Surgical " History:  He has a past surgical history that includes Other surgical history (05/31/2019); Other surgical history (05/31/2019); and Other surgical history (02/21/2020).      Social History:  He reports that he has never smoked. He has never used smokeless tobacco. He reports current alcohol use of about 6.0 standard drinks of alcohol per week. He reports that he does not use drugs.    Family History:  Family History[1]     Allergies:  Chlorpheniramine-acetaminophen and Coricidin    Outpatient Medications:  Current Outpatient Medications   Medication Instructions    amLODIPine (NORVASC) 2.5 mg, oral, Daily    atenolol (TENORMIN) 12.5 mg, oral, Daily    clobetasol (Temovate) 0.05 % external solution     erythromycin (Romycin) 5 mg/gram (0.5 %) ophthalmic ointment Apply to the right upper eyelid twice a day.    fish oil concentrate (Omega-3) 120-180 mg capsule 1 tablet, Daily    fluticasone (Flonase Sensimist) 27.5 mcg/actuation nasal spray 2 sprays, Daily RT    ketoconazole (NIZOral) 2 % shampoo     losartan (COZAAR) 50 mg, oral, Daily    multivitamin tablet 1 tablet, Daily    rosuvastatin (CRESTOR) 20 mg, oral, Nightly    tadalafil 20 mg, oral, Daily PRN       Physical Exam:  GENERAL:  pleasant 65 year-old  HEENT: No xanthelasma  NECK: Supple, no palpable adenopathy or thyromegaly  CHEST: Clear to auscultation, respiratory effort unlabored  CARDIAC: RRR, normal S1 and S2, no audible  rub, gallop, carotids are brisk, PMI is not displaced; there is a 1/6 systolic murmur at the RSB.  ABD: Active bowel sounds, nontender, no organomegaly, no evidence of ascites  EXT: No clubbing, cyanosis, edema, or tenderness  NEURO: Awake, alert, appropriate, speech is fluent       Last Labs:  CBC -  Lab Results   Component Value Date    WBC 5.5 11/11/2024    HGB 14.5 11/11/2024    HCT 41.8 11/11/2024    MCV 88 11/11/2024     11/11/2024       CMP -  Lab Results   Component Value Date    CALCIUM 9.6 11/11/2024    PHOS 3.5  02/09/2020    PROT 6.7 11/11/2024    ALBUMIN 4.2 11/11/2024    AST 16 11/11/2024    ALT 21 11/11/2024    ALKPHOS 46 11/11/2024    BILITOT 0.6 11/11/2024       LIPID PANEL -   Lab Results   Component Value Date    CHOL 182 11/11/2024    TRIG 98 11/11/2024    HDL 53.9 11/11/2024    CHHDL 3.4 11/11/2024    LDLF 126 (H) 09/29/2022    VLDL 20 11/11/2024    NHDL 128 11/11/2024       RENAL FUNCTION PANEL -   Lab Results   Component Value Date    GLUCOSE 95 11/11/2024     11/11/2024    K 4.5 11/11/2024     11/11/2024    CO2 29 11/11/2024    ANIONGAP 13 11/11/2024    BUN 17 11/11/2024    CREATININE 1.11 11/11/2024    GFRMALE 90 05/10/2023    CALCIUM 9.6 11/11/2024    PHOS 3.5 02/09/2020    ALBUMIN 4.2 11/11/2024        Lab Results   Component Value Date    HGBA1C 4.9 01/21/2020         Diagnostic review: I have independently interpreted the Echocardiogram .  My findings are  as summarized in the HPI.  .    Assessment/Plan   Assessment & Plan  Essential hypertension    Orders:    Follow Up In Cardiology    Follow Up In Cardiology; Future    Other hyperlipidemia    Orders:    Follow Up In Cardiology    Follow Up In Cardiology; Future    S/P ascending aortic replacement    Orders:    Follow Up In Cardiology    Follow Up In Cardiology; Future    S/P AVR    Orders:    Follow Up In Cardiology    Follow Up In Cardiology; Future    S/P AVR (aortic valve replacement)    Orders:    Follow Up In Cardiology; Future    Supraventricular tachycardia    Orders:    Follow Up In Cardiology; Future          Franco Weinberg MD         [1]   Family History  Problem Relation Name Age of Onset    Hypertension Mother      Stroke Mother

## 2025-05-20 ENCOUNTER — APPOINTMENT (OUTPATIENT)
Facility: HOSPITAL | Age: 65
End: 2025-05-20
Payer: MEDICARE

## 2025-05-27 ENCOUNTER — HOSPITAL ENCOUNTER (OUTPATIENT)
Dept: RADIOLOGY | Facility: CLINIC | Age: 65
Discharge: HOME | End: 2025-05-27
Payer: MEDICARE

## 2025-05-27 DIAGNOSIS — H53.2 DIPLOPIA: ICD-10-CM

## 2025-05-27 DIAGNOSIS — H05.402 ENOPHTHALMOS OF LEFT EYE: ICD-10-CM

## 2025-05-27 DIAGNOSIS — I10 ESSENTIAL HYPERTENSION: ICD-10-CM

## 2025-05-27 DIAGNOSIS — R73.09 ELEVATED GLUCOSE: ICD-10-CM

## 2025-05-27 PROCEDURE — 70543 MRI ORBT/FAC/NCK W/O &W/DYE: CPT

## 2025-05-27 PROCEDURE — A9575 INJ GADOTERATE MEGLUMI 0.1ML: HCPCS | Mod: JZ

## 2025-05-27 PROCEDURE — 70553 MRI BRAIN STEM W/O & W/DYE: CPT

## 2025-05-27 PROCEDURE — 2550000001 HC RX 255 CONTRASTS: Mod: JZ

## 2025-05-27 RX ORDER — GADOTERATE MEGLUMINE 376.9 MG/ML
0.2 INJECTION INTRAVENOUS
Status: COMPLETED | OUTPATIENT
Start: 2025-05-27 | End: 2025-05-27

## 2025-05-27 RX ADMIN — GADOTERATE MEGLUMINE 14.5 ML: 376.9 INJECTION INTRAVENOUS at 13:07

## 2025-05-29 ENCOUNTER — PATIENT MESSAGE (OUTPATIENT)
Dept: PRIMARY CARE | Facility: CLINIC | Age: 65
End: 2025-05-29
Payer: MEDICARE

## 2025-05-29 DIAGNOSIS — J32.9 SINUSITIS, UNSPECIFIED CHRONICITY, UNSPECIFIED LOCATION: Primary | ICD-10-CM

## 2025-05-29 RX ORDER — AMOXICILLIN AND CLAVULANATE POTASSIUM 875; 125 MG/1; MG/1
875 TABLET, FILM COATED ORAL 2 TIMES DAILY
Qty: 20 TABLET | Refills: 0 | Status: SHIPPED | OUTPATIENT
Start: 2025-05-29 | End: 2025-06-08

## 2025-05-29 RX ORDER — AMOXICILLIN AND CLAVULANATE POTASSIUM 875; 125 MG/1; MG/1
875 TABLET, FILM COATED ORAL 2 TIMES DAILY
Qty: 20 TABLET | Refills: 0 | Status: SHIPPED | OUTPATIENT
Start: 2025-05-29 | End: 2025-05-29 | Stop reason: SDUPTHER

## 2025-06-03 ENCOUNTER — OFFICE VISIT (OUTPATIENT)
Facility: CLINIC | Age: 65
End: 2025-06-03
Payer: MEDICARE

## 2025-06-03 VITALS — WEIGHT: 163.4 LBS | BODY MASS INDEX: 25.65 KG/M2 | HEIGHT: 67 IN

## 2025-06-03 DIAGNOSIS — J34.89 SILENT SINUS SYNDROME: ICD-10-CM

## 2025-06-03 DIAGNOSIS — J32.9 SINUSITIS, UNSPECIFIED CHRONICITY, UNSPECIFIED LOCATION: ICD-10-CM

## 2025-06-03 DIAGNOSIS — H05.412: ICD-10-CM

## 2025-06-03 DIAGNOSIS — J32.8 OTHER CHRONIC SINUSITIS: Primary | ICD-10-CM

## 2025-06-03 PROCEDURE — 1159F MED LIST DOCD IN RCRD: CPT | Performed by: OTOLARYNGOLOGY

## 2025-06-03 PROCEDURE — 1036F TOBACCO NON-USER: CPT | Performed by: OTOLARYNGOLOGY

## 2025-06-03 PROCEDURE — 1160F RVW MEDS BY RX/DR IN RCRD: CPT | Performed by: OTOLARYNGOLOGY

## 2025-06-03 PROCEDURE — 3008F BODY MASS INDEX DOCD: CPT | Performed by: OTOLARYNGOLOGY

## 2025-06-03 PROCEDURE — 99214 OFFICE O/P EST MOD 30 MIN: CPT | Performed by: OTOLARYNGOLOGY

## 2025-06-03 ASSESSMENT — PATIENT HEALTH QUESTIONNAIRE - PHQ9
1. LITTLE INTEREST OR PLEASURE IN DOING THINGS: NOT AT ALL
2. FEELING DOWN, DEPRESSED OR HOPELESS: NOT AT ALL
SUM OF ALL RESPONSES TO PHQ9 QUESTIONS 1 AND 2: 0

## 2025-06-03 NOTE — H&P (VIEW-ONLY)
Referring Provider:  Rosa Maria Marino MD  [address]  [facility], [address]  [place] [address]      History of Present Illness:  History of Present Illness  The patient presents for evaluation of left eye asymmetry.    He was initially evaluated by Dr. Hogue for external lesions, which were subsequently excised. During his visit, enophthalmos was observed, prompting further imaging studies which revealed opacification of the left frontal sinus, with ethmoid sinus opacification/obstruction and and asymmetric maxillary sinuses with orbital floor depression. I reviewed the CT and it appears to be consistent with silent sinus syndrome. He reports no history of facial trauma. The only incident he can recall is a minor impact to his left eye from his wife's elbow while they were sleeping in spring 2025, which did not result in any visible injury the following day. He also mentions a dental issue involving a cracked filling that was replaced in fall 2024. Despite the replacement, he continues to experience extreme sensitivity and pressure in the area. He recalls an episode a week or two prior to his dental visit where he felt a pressure-like movement between his sinus and gum while blowing his nose, as if the filling was being pushed on. However, his dentist did not identify any abnormalities on x-ray. He has a history of sinus issues, with less airflow noted on the left side compared to the right. He experienced severe cold-like symptoms from 12/2024 through 01/2025, which resolved by mid-February 2025. He also reports experiencing double vision since 03/2025. He has been holding off on going back to the dentist. He has been using Flonase for relief.    PAST SURGICAL HISTORY:  Aortic valve replacement and ascending aortic aneurysm repair - 2020     ?  Review of Systems:     Review of symptoms was negative except for those stated including Cardiopulmonary, Genitourinary, Gastrointestinal, Psychological,  Sleep pattern, Endocrine, Eyes, Neurologic, Musculoskeletal, Skin, Hematologic/Lymphatic and Allergic/Immunologic.     Medical History:     I have reviewed the patient's updated past medical history, surgical history, family history, social history, as well as current medications and allergies as of 5/6/2025. Changes to these items have been updated and marked as reviewed in the electronic medical record.     Physical Exam:     Vitals:  vitals were not taken for this visit.   General: Patient doing well overall and is in no apparent distress.  Psych: Pleasant affect, and answers questions appropriately.  Head & Face: Symmetric facial movements  Eyes: Pupils equal, round, reactive.  Extraocular movements intact without gaze restrictions or nystagmus. No epiphora.  Ears:  External auditory canals are normal.  Tympanic membranes are clear.  No middle ear effusion is seen.  All middle ear landmarks are normal.  Nose: Anterior rhinoscopy revealed normal sinonasal mucosa. More posterior areas of the nasal cavity could not be completely examined.  Oral Cavity/Oropharynx:  Without lesions or masses to visual exam.  Neck: Supple without lymphadenopathy.  Lungs: Non-labored, and without evidence of stridor.  Cardiac: Pulses are strong, well-perfused.  Extremities: Without gross evidence of clubbing, cyanosis, or edema.  Neuro: Cranial nerves II-XII grossly intact; Intact facial movements.     Assessment & Plan  1. Left eye asymmetry/enophthalmos.  Left eye asymmetry is likely due to silent sinus syndrome, as evidenced by MRI findings. The possibility of a benign tumor cannot be excluded at this stage. A CT scan will be ordered to further investigate the bone structure and any potential contributing dental issues. Surgical intervention is recommended to address the silent sinus syndrome and prevent worsening of diplopia. T    I discussed the risks benefits and alternatives to endoscopic sinus surgery with the patient.  These  risks included but were not limited to pain, bleeding, infection, need for additional procedures, damage to surrounding structures, septal perforation, nasal obstruction empty nose syndrome, damage to the orbit, skull base, and other structures, cerebrospinal fluid leak, orbital hematoma, meningitis, stroke, and even death.  I explained that these side effects are exceedingly rare but there is a nonzero risk.             Loco Royal MD, M.Eng.   of Otolaryngology - Head & Neck Surgery  Division of Rhinology and Endoscopic Skull Base Surgery  [facility]     This medical note was created with the assistance of artificial intelligence (AI) for documentation purposes. The content has been reviewed and confirmed by the healthcare provider for accuracy and completeness. Patient consented to the use of audio recording and use of AI during their visit.

## 2025-06-03 NOTE — PROGRESS NOTES
Referring Provider:  Rosa Maria Marino MD  [address]  [facility], [address]  [place] [address]      History of Present Illness:  History of Present Illness  The patient presents for evaluation of left eye asymmetry.    He was initially evaluated by Dr. Hogue for external lesions, which were subsequently excised. During his visit, enophthalmos was observed, prompting further imaging studies which revealed opacification of the left frontal sinus, with ethmoid sinus opacification/obstruction and and asymmetric maxillary sinuses with orbital floor depression. I reviewed the CT and it appears to be consistent with silent sinus syndrome. He reports no history of facial trauma. The only incident he can recall is a minor impact to his left eye from his wife's elbow while they were sleeping in spring 2025, which did not result in any visible injury the following day. He also mentions a dental issue involving a cracked filling that was replaced in fall 2024. Despite the replacement, he continues to experience extreme sensitivity and pressure in the area. He recalls an episode a week or two prior to his dental visit where he felt a pressure-like movement between his sinus and gum while blowing his nose, as if the filling was being pushed on. However, his dentist did not identify any abnormalities on x-ray. He has a history of sinus issues, with less airflow noted on the left side compared to the right. He experienced severe cold-like symptoms from 12/2024 through 01/2025, which resolved by mid-February 2025. He also reports experiencing double vision since 03/2025. He has been holding off on going back to the dentist. He has been using Flonase for relief.    PAST SURGICAL HISTORY:  Aortic valve replacement and ascending aortic aneurysm repair - 2020     ?  Review of Systems:     Review of symptoms was negative except for those stated including Cardiopulmonary, Genitourinary, Gastrointestinal, Psychological,  Sleep pattern, Endocrine, Eyes, Neurologic, Musculoskeletal, Skin, Hematologic/Lymphatic and Allergic/Immunologic.     Medical History:     I have reviewed the patient's updated past medical history, surgical history, family history, social history, as well as current medications and allergies as of 5/6/2025. Changes to these items have been updated and marked as reviewed in the electronic medical record.     Physical Exam:     Vitals:  vitals were not taken for this visit.   General: Patient doing well overall and is in no apparent distress.  Psych: Pleasant affect, and answers questions appropriately.  Head & Face: Symmetric facial movements  Eyes: Pupils equal, round, reactive.  Extraocular movements intact without gaze restrictions or nystagmus. No epiphora.  Ears:  External auditory canals are normal.  Tympanic membranes are clear.  No middle ear effusion is seen.  All middle ear landmarks are normal.  Nose: Anterior rhinoscopy revealed normal sinonasal mucosa. More posterior areas of the nasal cavity could not be completely examined.  Oral Cavity/Oropharynx:  Without lesions or masses to visual exam.  Neck: Supple without lymphadenopathy.  Lungs: Non-labored, and without evidence of stridor.  Cardiac: Pulses are strong, well-perfused.  Extremities: Without gross evidence of clubbing, cyanosis, or edema.  Neuro: Cranial nerves II-XII grossly intact; Intact facial movements.     Assessment & Plan  1. Left eye asymmetry/enophthalmos.  Left eye asymmetry is likely due to silent sinus syndrome, as evidenced by MRI findings. The possibility of a benign tumor cannot be excluded at this stage. A CT scan will be ordered to further investigate the bone structure and any potential contributing dental issues. Surgical intervention is recommended to address the silent sinus syndrome and prevent worsening of diplopia. T    I discussed the risks benefits and alternatives to endoscopic sinus surgery with the patient.  These  risks included but were not limited to pain, bleeding, infection, need for additional procedures, damage to surrounding structures, septal perforation, nasal obstruction empty nose syndrome, damage to the orbit, skull base, and other structures, cerebrospinal fluid leak, orbital hematoma, meningitis, stroke, and even death.  I explained that these side effects are exceedingly rare but there is a nonzero risk.             Looc Royal MD, M.Eng.   of Otolaryngology - Head & Neck Surgery  Division of Rhinology and Endoscopic Skull Base Surgery  [facility]     This medical note was created with the assistance of artificial intelligence (AI) for documentation purposes. The content has been reviewed and confirmed by the healthcare provider for accuracy and completeness. Patient consented to the use of audio recording and use of AI during their visit.

## 2025-06-04 ENCOUNTER — HOSPITAL ENCOUNTER (OUTPATIENT)
Dept: RADIOLOGY | Facility: HOSPITAL | Age: 65
Discharge: HOME | End: 2025-06-04
Payer: MEDICARE

## 2025-06-04 DIAGNOSIS — J32.8 OTHER CHRONIC SINUSITIS: ICD-10-CM

## 2025-06-04 PROCEDURE — 70486 CT MAXILLOFACIAL W/O DYE: CPT

## 2025-06-05 ENCOUNTER — PRE-ADMISSION TESTING (OUTPATIENT)
Dept: PREADMISSION TESTING | Facility: HOSPITAL | Age: 65
End: 2025-06-05
Payer: MEDICARE

## 2025-06-05 VITALS
SYSTOLIC BLOOD PRESSURE: 143 MMHG | HEIGHT: 67 IN | DIASTOLIC BLOOD PRESSURE: 82 MMHG | TEMPERATURE: 97.5 F | OXYGEN SATURATION: 96 % | WEIGHT: 161.5 LBS | HEART RATE: 63 BPM | BODY MASS INDEX: 25.35 KG/M2

## 2025-06-05 DIAGNOSIS — Z01.818 PREOPERATIVE EXAMINATION: Primary | ICD-10-CM

## 2025-06-05 DIAGNOSIS — R06.83 SNORING: ICD-10-CM

## 2025-06-05 DIAGNOSIS — Z91.89 AT RISK FOR SLEEP APNEA: ICD-10-CM

## 2025-06-05 DIAGNOSIS — J32.8 OTHER CHRONIC SINUSITIS: ICD-10-CM

## 2025-06-05 LAB
ABO GROUP (TYPE) IN BLOOD: NORMAL
ANION GAP SERPL CALC-SCNC: 14 MMOL/L (ref 10–20)
ANTIBODY SCREEN: NORMAL
BASOPHILS # BLD AUTO: 0.07 X10*3/UL (ref 0–0.1)
BASOPHILS NFR BLD AUTO: 1.3 %
BUN SERPL-MCNC: 21 MG/DL (ref 6–23)
CALCIUM SERPL-MCNC: 9.8 MG/DL (ref 8.6–10.6)
CHLORIDE SERPL-SCNC: 105 MMOL/L (ref 98–107)
CHOLEST SERPL-MCNC: 140 MG/DL
CHOLEST/HDLC SERPL: 2.6 (CALC)
CO2 SERPL-SCNC: 27 MMOL/L (ref 21–32)
CREAT SERPL-MCNC: 1.04 MG/DL (ref 0.5–1.3)
EGFRCR SERPLBLD CKD-EPI 2021: 79 ML/MIN/1.73M*2
EOSINOPHIL # BLD AUTO: 0.07 X10*3/UL (ref 0–0.7)
EOSINOPHIL NFR BLD AUTO: 1.3 %
ERYTHROCYTE [DISTWIDTH] IN BLOOD BY AUTOMATED COUNT: 14.1 % (ref 11.5–14.5)
EST. AVERAGE GLUCOSE BLD GHB EST-MCNC: 88 MG/DL
GLUCOSE SERPL-MCNC: 88 MG/DL (ref 74–99)
HBA1C MFR BLD: 4.7 % (ref ?–5.7)
HCT VFR BLD AUTO: 43 % (ref 41–52)
HDLC SERPL-MCNC: 53 MG/DL
HGB BLD-MCNC: 14.6 G/DL (ref 13.5–17.5)
IMM GRANULOCYTES # BLD AUTO: 0.01 X10*3/UL (ref 0–0.7)
IMM GRANULOCYTES NFR BLD AUTO: 0.2 % (ref 0–0.9)
LDLC SERPL CALC-MCNC: 71 MG/DL (CALC)
LYMPHOCYTES # BLD AUTO: 1.22 X10*3/UL (ref 1.2–4.8)
LYMPHOCYTES NFR BLD AUTO: 23 %
MCH RBC QN AUTO: 30.3 PG (ref 26–34)
MCHC RBC AUTO-ENTMCNC: 34 G/DL (ref 32–36)
MCV RBC AUTO: 89 FL (ref 80–100)
MONOCYTES # BLD AUTO: 0.72 X10*3/UL (ref 0.1–1)
MONOCYTES NFR BLD AUTO: 13.6 %
NEUTROPHILS # BLD AUTO: 3.22 X10*3/UL (ref 1.2–7.7)
NEUTROPHILS NFR BLD AUTO: 60.6 %
NONHDLC SERPL-MCNC: 87 MG/DL (CALC)
NRBC BLD-RTO: 0 /100 WBCS (ref 0–0)
PLATELET # BLD AUTO: 309 X10*3/UL (ref 150–450)
POTASSIUM SERPL-SCNC: 5 MMOL/L (ref 3.5–5.3)
RBC # BLD AUTO: 4.82 X10*6/UL (ref 4.5–5.9)
RH FACTOR (ANTIGEN D): NORMAL
SODIUM SERPL-SCNC: 141 MMOL/L (ref 136–145)
TRIGL SERPL-MCNC: 82 MG/DL
WBC # BLD AUTO: 5.3 X10*3/UL (ref 4.4–11.3)

## 2025-06-05 PROCEDURE — 87081 CULTURE SCREEN ONLY: CPT

## 2025-06-05 PROCEDURE — 36415 COLL VENOUS BLD VENIPUNCTURE: CPT | Performed by: INTERNAL MEDICINE

## 2025-06-05 PROCEDURE — 86901 BLOOD TYPING SEROLOGIC RH(D): CPT

## 2025-06-05 PROCEDURE — 86900 BLOOD TYPING SEROLOGIC ABO: CPT

## 2025-06-05 PROCEDURE — 83036 HEMOGLOBIN GLYCOSYLATED A1C: CPT | Performed by: INTERNAL MEDICINE

## 2025-06-05 PROCEDURE — 80048 BASIC METABOLIC PNL TOTAL CA: CPT

## 2025-06-05 PROCEDURE — 85025 COMPLETE CBC W/AUTO DIFF WBC: CPT

## 2025-06-05 RX ORDER — CHLORHEXIDINE GLUCONATE 40 MG/ML
SOLUTION TOPICAL
Qty: 473 ML | Refills: 0 | Status: SHIPPED | OUTPATIENT
Start: 2025-06-05 | End: 2025-06-05 | Stop reason: ENTERED-IN-ERROR

## 2025-06-05 RX ORDER — CHLORHEXIDINE GLUCONATE ORAL RINSE 1.2 MG/ML
SOLUTION DENTAL
Qty: 15 ML | Refills: 0 | Status: SHIPPED | OUTPATIENT
Start: 2025-06-05

## 2025-06-05 RX ORDER — CHLORHEXIDINE GLUCONATE ORAL RINSE 1.2 MG/ML
SOLUTION DENTAL
Qty: 15 ML | Refills: 0 | Status: SHIPPED | OUTPATIENT
Start: 2025-06-05 | End: 2025-06-05 | Stop reason: ENTERED-IN-ERROR

## 2025-06-05 RX ORDER — CHLORHEXIDINE GLUCONATE 40 MG/ML
SOLUTION TOPICAL
Qty: 473 ML | Refills: 0 | Status: SHIPPED | OUTPATIENT
Start: 2025-06-05

## 2025-06-05 ASSESSMENT — DUKE ACTIVITY SCORE INDEX (DASI)
CAN YOU DO YARD WORK LIKE RAKING LEAVES, WEEDING OR PUSHING A MOWER: YES
CAN YOU WALK A BLOCK OR TWO ON LEVEL GROUND: YES
CAN YOU RUN A SHORT DISTANCE: YES
CAN YOU TAKE CARE OF YOURSELF (EAT, DRESS, BATHE, OR USE TOILET): YES
CAN YOU PARTICIPATE IN STRENOUS SPORTS LIKE SWIMMING, SINGLES TENNIS, FOOTBALL, BASKETBALL, OR SKIING: YES
DASI METS SCORE: 9.9
CAN YOU DO MODERATE WORK AROUND THE HOUSE LIKE VACUUMING, SWEEPING FLOORS OR CARRYING GROCERIES: YES
CAN YOU DO LIGHT WORK AROUND THE HOUSE LIKE DUSTING OR WASHING DISHES: YES
CAN YOU CLIMB A FLIGHT OF STAIRS OR WALK UP A HILL: YES
CAN YOU PARTICIPATE IN MODERATE RECREATIONAL ACTIVITIES LIKE GOLF, BOWLING, DANCING, DOUBLES TENNIS OR THROWING A BASEBALL OR FOOTBALL: YES
CAN YOU DO HEAVY WORK AROUND THE HOUSE LIKE SCRUBBING FLOORS OR LIFTING AND MOVING HEAVY FURNITURE: YES
CAN YOU HAVE SEXUAL RELATIONS: YES
TOTAL_SCORE: 58.2
CAN YOU WALK INDOORS, SUCH AS AROUND YOUR HOUSE: YES

## 2025-06-05 ASSESSMENT — ENCOUNTER SYMPTOMS
EYES NEGATIVE: 1
SINUS CONGESTION: 1
NECK NEGATIVE: 1
CONSTITUTIONAL NEGATIVE: 1
ENDOCRINE NEGATIVE: 1
MUSCULOSKELETAL NEGATIVE: 1
CARDIOVASCULAR NEGATIVE: 1
GASTROINTESTINAL NEGATIVE: 1
NEUROLOGICAL NEGATIVE: 1
COUGH: 1

## 2025-06-05 ASSESSMENT — LIFESTYLE VARIABLES: SMOKING_STATUS: NONSMOKER

## 2025-06-05 NOTE — CPM/PAT H&P
"CPM/PAT Evaluation       Name: Merlin Coleman (Merlin Coleman \"Gene\")  /Age: 1960/65 y.o.       Visit Type:   In-Person       Chief Complaint: perioperative evaluation    HPI    Merlin Coleman is a 65 y.o. male scheduled for bilateral endoscopic sinus surgery with image guidance on 2025 secondary to chronic sinusitis with Dr. Royal who referred to [facility].  Presents to [facility] today for perioperative risk stratification and optimization. PMHx includes Valve replacement, hyperlipidemia, hypertension, cataracts status post removal, GERD, history of kidney stones, anemia, history of blood transfusion, aneurysm of ascending aorta, chronic sinusitis.      Medical History[1]    Surgical History[2]    Patient Sexual activity questions deferred to the physician.    Family History[3]    Allergies[4]    Prior to Admission medications    Medication Sig Start Date End Date Taking? Authorizing Provider   amLODIPine (Norvasc) 2.5 mg tablet Take 1 tablet (2.5 mg) by mouth once daily. 24   Rosa Maria Marino MD   amoxicillin-clavulanate (Augmentin) 875-125 mg tablet Take 1 tablet by mouth 2 times a day for 10 days. 25  Rosa Maria Marino MD   atenolol (Tenormin) 25 mg tablet Take 0.5 tablets (12.5 mg) by mouth once daily. 25   Franco Weinberg MD   clobetasol (Temovate) 0.05 % external solution  25   Historical Provider, MD   erythromycin (Romycin) 5 mg/gram (0.5 %) ophthalmic ointment Apply to the right upper eyelid twice a day. 25   Sarah Henderson MD   fish oil concentrate (Omega-3) 120-180 mg capsule Take 1 tablet by mouth once daily. 12   Historical Provider, MD   fluticasone (Flonase Sensimist) 27.5 mcg/actuation nasal spray Administer 2 sprays into each nostril once daily.    Historical Provider, MD   ketoconazole (NIZOral) 2 % shampoo  24   Historical Provider, MD   losartan (Cozaar) 50 mg tablet Take 1 tablet (50 mg) by mouth once daily. 25   Rosa Maria Marino MD " "  multivitamin tablet Take 1 tablet by mouth once daily.    Historical Provider, MD   rosuvastatin (Crestor) 20 mg tablet Take 1 tablet (20 mg) by mouth once daily at bedtime. 2/27/25 4/3/26  Rosa Maria Marino MD   tadalafil (Cialis) 20 mg tablet Take 1 tablet (20 mg) by mouth once daily as needed for erectile dysfunction. 2/27/25 2/27/26  Rosa Maria Marino MD        PAT ROS:   Constitutional:   neg    Neuro/Psych:   neg    Eyes:   neg    Ears:   neg    Nose:    sinus congestion (sinus pressure)  Mouth:   neg    Throat:   neg    Neck:   neg    Cardio:   neg    Respiratory:    cough (chronic post nasal drip)  Endocrine:   neg    GI:   neg    :   neg    Musculoskeletal:   neg    Hematologic:   neg    Skin:  neg        Physical Exam  Vitals reviewed. Physical exam within normal limits.   Constitutional:       General: He is not in acute distress.     Appearance: Normal appearance. He is normal weight.   HENT:      Head: Normocephalic.   Cardiovascular:      Rate and Rhythm: Normal rate.      Heart sounds: Normal heart sounds.   Pulmonary:      Effort: Pulmonary effort is normal.      Breath sounds: Normal breath sounds.   Musculoskeletal:         General: Normal range of motion.      Cervical back: Normal range of motion.   Skin:     General: Skin is warm and dry.   Neurological:      General: No focal deficit present.      Mental Status: He is alert and oriented to person, place, and time. Mental status is at baseline.   Psychiatric:         Behavior: Behavior normal.          PAT AIRWAY:   Airway:     Mallampati::  III    TM distance::  >3 FB    Neck ROM::  Full  normal         Visit Vitals  /82   Pulse 63   Temp 36.4 °C (97.5 °F)   Ht 1.702 m (5' 7\")   Wt 73.3 kg (161 lb 8 oz)   SpO2 96%   BMI 25.29 kg/m²   Smoking Status Never   BSA 1.86 m²       DASI Risk Score      Flowsheet Row Pre-Admission Testing from 6/5/2025 in [facility] Questionnaire Series Submission from 6/3/2025 in [facility] [place] with Generic Provider Morgan   Can you take care of yourself (eat, dress, bathe, or use toilet)?  2.75 filed at 06/05/2025 1037 2.75  filed at 06/03/2025 2124   Can you walk indoors, such as around your house? 1.75 filed at 06/05/2025 1037 1.75  filed at 06/03/2025 2124   Can you walk a block or two on level ground?  2.75 filed at 06/05/2025 1037 2.75  filed at 06/03/2025 2124   Can you climb a flight of stairs or walk up a hill? 5.5 filed at 06/05/2025 1037 5.5  filed at 06/03/2025 2124   Can you run a short distance? 8 filed at 06/05/2025 1037 8  filed at 06/03/2025 2124   Can you do light work around the house like dusting or washing dishes? 2.7 filed at 06/05/2025 1037 2.7  filed at 06/03/2025 2124   Can you do moderate work around the house like vacuuming, sweeping floors or carrying groceries? 3.5 filed at 06/05/2025 1037 3.5  filed at 06/03/2025 2124   Can you do heavy work around the house like scrubbing floors or lifting and moving heavy furniture?  8 filed at 06/05/2025 1037 8  filed at 06/03/2025 2124   Can you do yard work like raking leaves, weeding or pushing a mower? 4.5 filed at 06/05/2025 1037 4.5  filed at 06/03/2025 2124   Can you have sexual relations? 5.25 filed at 06/05/2025 1037 5.25  filed at 06/03/2025 2124   Can you participate in moderate recreational activities like golf, bowling, dancing, doubles tennis or throwing a baseball or football? 6 filed at 06/05/2025 1037 6  filed at 06/03/2025 2124   Can you participate in strenous sports like swimming, singles tennis, football, basketball, or skiing? 7.5 filed at 06/05/2025 1037 7.5  filed at 06/03/2025 2124   DASI SCORE 58.2 filed at 06/05/2025 1037 58.2  filed at 06/03/2025 2124   METS Score (Will be calculated only when all the questions are answered) 9.9 filed at 06/05/2025 1037 9.9  filed at 06/03/2025 2124          Caprini DVT Assessment      Flowsheet Row Pre-Admission Testing from 6/5/2025 in [facility]  Center   DVT Score (IF A SCORE IS NOT CALCULATING, MUST SELECT A BMI TO COMPLETE) 5 filed at 06/05/2025 1543   Surgical Factors Major surgery planned, including arthroscopic and laproscopic (1-2 hours) filed at 06/05/2025 1543   BMI (BMI MUST BE CHOSEN) 30 or less filed at 06/05/2025 1543          Modified Frailty Index      Flowsheet Row Pre-Admission Testing from 6/5/2025 in [facility]   Non-independent functional status (problems with dressing, bathing, personal grooming, or cooking) 0 filed at 06/05/2025 1544   History of diabetes mellitus  0 filed at 06/05/2025 1544   History of COPD 0 filed at 06/05/2025 1544   History of CHF No filed at 06/05/2025 1544   History of MI 0 filed at 06/05/2025 1544   History of Percutaneous Coronary Intervention, Cardiac Surgery, or Angina No filed at 06/05/2025 1544   Hypertension requiring the use of medication  0.0909 filed at 06/05/2025 1544   Peripheral vascular disease 0 filed at 06/05/2025 1544   Impaired sensorium (cognitive impairement or loss, clouding, or delirium) 0 filed at 06/05/2025 1544   TIA or CVA withouy residual deficit 0 filed at 06/05/2025 1544   Cerebrovascular accident with deficit 0 filed at 06/05/2025 1544   Modified Frailty Index Calculator .0909 filed at 06/05/2025 1544          [id number] Stroke Risk Points  Current as of just now        N/A 0 to 9 Points:      Last Change: N/A          The [id number] risk score (Lip NORI, et al. 2009. © 2010 [employer]) quantifies the risk of stroke for a patient with atrial fibrillation. For patients without atrial fibrillation or under the age of 18 this score appears as N/A. Higher score values generally indicate higher risk of stroke.        This score is not applicable to this patient. Components are not calculated.          Revised Cardiac Risk Index      Flowsheet Row Pre-Admission Testing from 6/5/2025 in [facility]   High-Risk Surgery  (Intraperitoneal, Intrathoracic,Suprainguinal vascular) 0 filed at 06/05/2025 1543   History of ischemic heart disease (History of MI, History of positive exercuse test, Current chest paint considered due to myocardial ischemia, Use of nitrate therapy, ECG with pathological Q Waves) 0 filed at 06/05/2025 1543   History of congestive heart failure (pulmonary edemia, bilateral rales or S3 gallop, Paroxysmal nocturnal dyspnea, CXR showing pulmonary vascular redistribution) 0 filed at 06/05/2025 1543   History of cerebrovascular disease (Prior TIA or stroke) 0 filed at 06/05/2025 1543   Pre-operative insulin treatment 0 filed at 06/05/2025 1543   Pre-operative creatinine>2 mg/dl 0 filed at 06/05/2025 1543   Revised Cardiac Risk Calculator 0 filed at 06/05/2025 1543          Apfel Simplified Score      Flowsheet Row Pre-Admission Testing [facility] 6/5/20[facility] in [facility]   Smoking status 1 filed at 06/05/2025 1544   History of motion sickness or PONV  0 filed at 06/05/2025 1544   Use of postoperative opioids 1 filed at 06/05/2025 1544   Gender - Female 0=No filed at 06/05/2025 1544   Apfel Simplified Score Calculator 2 filed at 06/05/2025 1544          Risk Analysis Index Results This Encounter    No data found in the last 10 encounters.       Stop Bang Score      Flowsheet Row Pre-Admission Testing from 6/5/2025 in [facility] Questionnaire Series Submission from 6/3/2025 in [facility] [place] with Generic Provider Morgan   Do you snore loudly? 1 filed at 06/05/2025 1036 1  filed at 06/03/2025 2124   Do you often feel tired or fatigued after your sleep? 0 filed at 06/05/2025 1036 0  filed at 06/03/2025 2124   Has anyone ever observed you stop breathing in your sleep? 0 filed at 06/05/2025 1036 0  filed at 06/03/2025 2124   Do you have or are you being treated for high blood pressure? 1 filed at 06/05/2025 1036 1  filed at 06/03/2025 2124   Recent BMI (Calculated) 25.6 filed  at 06/05/2025 1036 25.6  filed at 06/03/2025 2124   Is BMI greater than 35 kg/m2? 0=No filed at 06/05/2025 1036 0=No  filed at 06/03/2025 2124   Age older than 50 years old? 1=Yes filed at 06/05/2025 1036 1=Yes  filed at 06/03/2025 2124   Is your neck circumference greater than 17 inches (Male) or 16 inches (Female)? 0 filed at 06/05/2025 1036 --   Gender - Male 1=Yes filed at 06/05/2025 1036 1=Yes  filed at 06/03/2025 2124   STOP-BANG Total Score 4 filed at 06/05/2025 1036 --          Prodigy: High Risk  Total Score: 16              Prodigy Age Score      Prodigy Gender Score          ARISCAT Score for Postoperative Pulmonary Complications      Flowsheet Row Pre-Admission Testing from 6/5/2025 in [facility]   Age Calculated Score 3 filed at 06/05/2025 1544   Preoperative SpO2 0 filed at 06/05/2025 1544   Respiratory infection in the last month Either upper or lower (i.e., URI, bronchitis, pneumonia), with fever and antibiotic treatment 0 filed at 06/05/2025 1544   Preoperative anemia (Hgb less than 10 g/dl) 0 filed at 06/05/2025 1544   Surgical incision  0 filed at 06/05/2025 1544   Duration of surgery  0 filed at 06/05/2025 1544   Emergency Procedure  0 filed at 06/05/2025 1544   ARISCAT Total Score  3 filed at 06/05/2025 1544          [place] Perioperative Risk for Myocardial Infarction or Cardiac Arrest (ANGIE)      Flowsheet Row Pre-Admission Testing from 6/5/2025 in [facility]   Calculated Age Score 1.32 filed at 06/05/2025 1544   Functional Status  0 filed at 06/05/2025 1544   ASA Class  -3.29 filed at 06/05/2025 1544   Creatinine 0 filed at 06/05/2025 1544   Type of Procedure  0.71 filed at 06/05/2025 1544   ANGIE Total Score  -6.51 filed at 06/05/2025 1544   ANGIE % 0.15 filed at 06/05/2025 1544          Assessment and Plan:    Anesthesia/airway:  No anesthesia complications    Neuro:  No Neuro diagnosis or significant findings on chart review or clinical presentation and  "evaluation. No further preoperative testing/intervention indicated at this time.    Alert and oriented x 3.  No cognitive decline.     Patient is at increased risk for perioperative CVA secondary to  cardiac disease, increased age    HEENT  # Chronic sinusitis -patient presents today for seasonal allergies - Preop evaluation prior to bilateral endoscopic sinus surgery.  Medicated with fluticasone and completing course of Augmentin currently.  Follows with ENT    Patient denies needing corrective lenses.  Denies dentures, missing teeth, broken teeth.  Denies hearing loss.    Cardiovascular  #Hypertension, Aortic valve replaced, hyperlipidemia, ascending aortic aneurysm status postsurgery - medicated with amlodipine, atenolol, losartan, rosuvastatin and follows with PCP. Pt denies cardiovascular symptoms.  Patient denies exercise intolerance.  Hypertension is well-controlled. Physical exam is benign. METS is >4  and scheduled for non-cardiac surgery.  No additional preoperative testing is currently indicated.    Vital signs today are:  /82   Pulse 63   Temp 36.4 °C (97.5 °F)   Ht 1.702 m (5' 7\")   Wt 73.3 kg (161 lb 8 oz)   SpO2 96%   BMI 25.29 kg/m²     METS: 9.9  RCRI: 0 points, 3.9%  30 day risk of MACE (risk for cardiac death, nonfatal myocardial infarction, and nonfactal cardiac arrest)  ANGIE: 0.15% risk for 30-day postoperative MACE  EKG -March 10, 2025  Normal sinus rhythm     ECHO April 25, 2025   CONCLUSIONS:   1. The left ventricular systolic function is normal, with a Frye's biplane calculated ejection fraction of 65%.   2. The estimated RVSP is 31 mm.   3. By history, the patient is S/P bioprosthetic AVR and root replacement with a composite graft (27 mm Medtronic bovine pericardial valve and 32 mm Gelweave aortic graft). The peak velocity across the aortic valve prosthesis is 2.79 m/s, corresponding with a dimensionless index of the aortic valve is 0.45. Findings are consistent with a " normally functioning aortic valve bioprosthesis.   4. By history, the patient is S/P prior composite graft (27 mm Medtronic bovine pericardial valve and 32 mm Gelweave aortic graft). The graft is functioning normally.       Pulmonary  No Pulmonary diagnosis or significant findings on chart review or clinical presentation and evaluation. Physical exam is benign, denies respiratory symptoms.  No further perioperative intervention.     Preoperative deep breathing educational handout provided to patient.    Patient is at increased risk of perioperative complications secondary to  age > 60, site of surgery, major surgery, types of anesthetic    Stop Bang 4 point(s) which is a intermediate risk for moderate to severe RENA   --Referral to sleep medicine sent - Does not need to complete prior to procedure    ARISCAT: <26 points, 1.6% risk of in-hospital postoperative pulmonary complication         PRODIGY: 19 points which is a High risk for opioid-induced respiratory depression          Pumonary toilet education discussed, patient also provided deep breathing exercises and incentive spirometry educational handout      Renal  No Renal diagnosis or significant findings on chart review or clinical presentation and evaluation. No further preoperative testing or intervention is indicated at this time.      No renal diagnosis, however patient is at increase risk for perioperative renal complications secondary to  Age equal to or greater than 56, HTN, use of an ace, arb, or NSAID      Endocrine  No endocrine diagnosis or significant findings on chart review or clinical presentation and evaluation. No further testing or intervention is indicated at this time.    Pt denies steroids in last 6 months        Hematologic  #Anemia - platelet, Hgb, and hematocrit within normal limits. past labwork wnl, repeat today.   Patient confirms that they will accept blood should they need it.     Caprini Score 5, patient at High risk for  perioperative DVT.                      Patient provided VTE education/handout.      Gastrointestinal  #GERD -patient reports well-controlled GERD.   Patient is able to lie flat, no heartburn, no globus feeling in throat.  Denies GI/ symptoms.  Patient is medicated with omeprazole (continue).     Eat-10 score 0: self-perceived oropharyngeal dysphagia scale (0-40)     Apfel 2 points 39% risk for post operative N/V    Infectious disease  No infectious diagnosis or significant findings on chart review or clinical presentation and evaluation.     Prescription provided for CHG body wash and dental rinse. CHG use instructions reviewed and provided to patient.  Staph screen collected    Patient denies use of antibiotics in the past 3 months.    Musculoskeletal  #Arthritis - patient has history of arthritis.  In multiple joints.  Instructed to avoid NSAIDs 7 days prior to surgery.  Expresses understanding.  No further workup needed.    Orders  Labs & Imaging ordered and review:  CBC, BMP, MRSA, T&S, HbA1c    Laboratory results  Recent Results (from the past 16 weeks)   Transthoracic Echo Complete    Collection Time: 04/25/25  2:56 PM   Result Value Ref Range    AV pk sebastian 2.79 m/s    LV Biplane EF 65 %    LVOT diam 2.03 cm    MV E/A ratio 1.47     Tricuspid annular plane systolic excursion 1.8 cm    LA vol index A/L 32.1 ml/m2    AV mn grad 16 mmHg    LV EF 65 %    RV free wall pk S' 12.94 cm/s    RVSP 31.2 mmHg    LVIDd 5.33 cm    Aortic Valve Area by Continuity of Peak Velocity 1.17 cm2    AV pk grad 31 mmHg    Aortic Valve Area by Continuity of VTI 1.46 cm2    LV A4C EF 42.0    Surgical Pathology Exam    Collection Time: 04/29/25 10:26 AM   Result Value Ref Range    Case Report       Surgical Pathology                                Case: [id number]                                  Authorizing Provider:  Rosalinda Hogue MD          Collected:           04/29/2025 1026              Ordering Location:     [facility]       Received:            04/29/2025 9698              Pathologist:           Martha Jackson MD                                                          Specimen:    EYELID RIGHT                                                                               FINAL DIAGNOSIS       A. Skin, right upper eyelid, excisional biopsy:  --Seborrheic keratosis              By the signature on this report, the individual or group listed as making the Final Interpretation/Diagnosis certifies that they have reviewed this case.       Clinical History       papillomatous frondular lesion; no madarosis/ulceration/telangiectasia      Gross Description       A: Received in formalin, labeled with the patient's name and hospital number, is a biopsy of skin measuring 0.4 x 0.3 x 0.3 cm.  The skin surface is remarkable for a 0.4 cm, white, elevated, granular area.  The resection margin is inked.  The specimen is bisected and submitted entirely in one cassette.    DMB     Hemoglobin A1C    Collection Time: 06/03/25  6:48 PM   Result Value Ref Range    HEMOGLOBIN A1c 4.9 <5.7 %    eAG (mg/dL) 94 mg/dL    eAG (mmol/L) 5.2 mmol/L   Lipid Panel    Collection Time: 06/04/25  9:55 AM   Result Value Ref Range    CHOLESTEROL, TOTAL 140 <200 mg/dL    HDL CHOLESTEROL 53 > OR = 40 mg/dL    TRIGLYCERIDES 82 <150 mg/dL    LDL-CHOLESTEROL 71 mg/dL (calc)    CHOL/HDLC RATIO 2.6 <5.0 (calc)    NON HDL CHOLESTEROL 87 <130 mg/dL (calc)   Hemoglobin A1C    Collection Time: 06/05/25 11:12 AM   Result Value Ref Range    Hemoglobin A1C 4.7 See comment %    Estimated Average Glucose 88 Not Established mg/dL   Basic Metabolic Panel    Collection Time: 06/05/25 11:12 AM   Result Value Ref Range    Glucose 88 74 - 99 mg/dL    Sodium 141 136 - 145 mmol/L    Potassium 5.0 3.5 - 5.3 mmol/L    Chloride 105 98 - 107 mmol/L    Bicarbonate 27 21 - 32 mmol/L    Anion Gap 14 10 - 20 mmol/L    Urea Nitrogen 21 6 - 23 mg/dL    Creatinine 1.04 0.50 - 1.30 mg/dL     eGFR 79 >60 mL/min/1.73m*2    Calcium 9.8 8.6 - 10.6 mg/dL   Type And Screen Is this order related to pregnancy or an upcoming surgery? Yes; Where will [facility] surgery[facility]delivery [facility]; What is the date of the surgery? 6/17/2025; Has this patient ever had a transfusion? Unknown; ...    Collection Time: 06/05/25 11:12 AM   Result Value Ref Range    ABO TYPE O     Rh TYPE POS     ANTIBODY SCREEN NEG    CBC and Auto Differential    Collection Time: 06/05/25 11:12 AM   Result Value Ref Range    WBC 5.3 4.4 - 11.3 x10*3/uL    nRBC 0.0 0.0 - 0.0 /100 WBCs    RBC 4.82 4.50 - 5.90 x10*6/uL    Hemoglobin 14.6 13.5 - 17.5 g/dL    Hematocrit 43.0 41.0 - 52.0 %    MCV 89 80 - 100 fL    MCH 30.3 26.0 - 34.0 pg    MCHC 34.0 32.0 - 36.0 g/dL    RDW 14.1 11.5 - 14.5 %    Platelets 309 150 - 450 x10*3/uL    Neutrophils % 60.6 40.0 - 80.0 %    Immature Granulocytes %, Automated 0.2 0.0 - 0.9 %    Lymphocytes % 23.0 13.0 - 44.0 %    Monocytes % 13.6 2.0 - 10.0 %    Eosinophils % 1.3 0.0 - 6.0 %    Basophils % 1.3 0.0 - 2.0 %    Neutrophils Absolute 3.22 1.20 - 7.70 x10*3/uL    Immature Granulocytes Absolute, Automated 0.01 0.00 - 0.70 x10*3/uL    Lymphocytes Absolute 1.22 1.20 - 4.80 x10*3/uL    Monocytes Absolute 0.72 0.10 - 1.00 x10*3/uL    Eosinophils Absolute 0.07 0.00 - 0.70 x10*3/uL    Basophils Absolute 0.07 0.00 - 0.10 x10*3/uL        Other  Hold all vitamins and supplements 7 days prior to surgery  Tylenol okay to continue, please hold Aleve/naproxen/ibuprofen/Excedrin/Motrin/Mobic (NSAIDs) for 7 days prior to surgery  No lotion/moisturizers or Deoderant after last shower prior to surgery    Medication, NPO, and all other CPM instructions were reviewed with the patient.  All patient questions were addressed.    RUTHANN Cruz         [1]   Past Medical History:  Diagnosis Date    Anemia 06/28/2023    Aneurysm of the ascending aorta, without rupture 02/14/2020    Ascending aortic  aneurysm    Cataract     Chronic GERD 06/28/2023    Constipation 06/28/2023    Elevated liver enzymes 06/28/2023    Heart valve disease     bovine tissue valve    History of blood transfusion     Hyperlipidemia     Hypertension     Nephrolithiasis     Other pericardial effusion (noninflammatory) ([facility]) 01/15/2021    Pericardial effusion    Other specified abnormal findings of blood chemistry 09/30/2020    Abnormal LFTs    Personal history of (corrected) congenital malformations of heart and circulatory system 01/07/2020    History of congenital aortic insufficiency    Personal history of (corrected) congenital malformations of heart and circulatory system 01/27/2017    History of bicuspid heart valve    Personal history of (corrected) congenital malformations of heart and circulatory system 01/07/2020    History of bicuspid aortic valve    Personal history of other diseases of the circulatory system 03/25/2020    History of aortic valve stenosis    Postcardiotomy syndrome 05/06/2020    Postpericardiotomy syndrome    Thoracic aortic aneurysm, without rupture, unspecified 01/07/2020    Aneurysm, aorta, thoracic    Vision loss    [2]   Past Surgical History:  Procedure Laterality Date    AORTIC VALVE REPLACEMENT      CARDIAC CATHETERIZATION      OTHER SURGICAL HISTORY  05/31/2019    Colonoscopy    OTHER SURGICAL HISTORY  05/31/2019    Lipoma excision    OTHER SURGICAL HISTORY  02/21/2020    Thoracic aortic aneurysm repair    SKIN LESION EXCISION      anal lesion   [3]   Family History  Problem Relation Name Age of Onset    Hypertension Mother      Stroke Mother      Stroke Maternal Grandfather      Diabetes Paternal Grandfather     [4]   Allergies  Allergen Reactions    Chlorpheniramine-Acetaminophen Other    Coricidin Palpitations

## 2025-06-05 NOTE — PREPROCEDURE INSTRUCTIONS
Thank you for visiting [facility] ([facility]) today for your pre-procedure evaluation, you were seen by     RUTHANN Cruz  Department of Anesthesiology and Perioperative Medicine  Main phone [phone]  Fax [phone]    This summary includes instructions and information to aid you during your perioperative period.  Please read carefully. If you have any questions about your visit today, please call the number listed above.  If you become ill or have any changes to your health before your surgery, please contact your primary care provider and alert your surgeon.    General Medications Instructions (see back for further medication instructions)  Hold all vitamins and supplements 7 days prior to surgery  Tylenol okay to continue, please HOLD Aleve/naproxen/ibuprofen (NSAIDs) for 7 days prior to surgery  No lotion/moisturizers or Deoderant after last shower prior to surgery    You will be called business day prior to surgery to confirm arrival time.     Preparing for Surgery       Preoperative Fasting Guidelines  Why must I stop eating and drinking near surgery time?  With sedation, food or liquid in your stomach can enter your lungs causing serious complications  Food can increase nausea and vomiting  When do I need to stop eating and drinking before my surgery?  Do not eat any food after midnight the night before your surgery/procedure.  You may have up to 13.5 ounces of clear liquid until TWO hours before your instructed arrival time to the hospital.  This includes water, black tea/coffee, (no milk or cream) apple juice, and electrolyte drinks (Gatorade)  You may chew gum until TWO hours before your surgery/procedure   Do not eat any food after midnight the night before your surgery/procedure. You may have up to 13.5 ounces of clear liquid until TWO hours before your instructed arrival time to the hospital.  This includes water, black tea/coffee, (no milk or cream)  apple juice, and electrolyte drinks (Gatorade). You may chew gum until TWO hours before your surgery/procedure       Tobacco and Alcohol;  Do not drink alcohol or smoke within 24 hours of surgery.  It is best to quit smoking for as long as possible before any surgery or procedure.    Home Preoperative Antibacterial Shower     What is a home preoperative antibacterial shower?  This shower is a way of cleaning the skin with a germ killing soap before surgery.  The soap contains chlorhexidine, commonly known as CHG.  CHG is a soap for your skin with germ killing ability.  Let your doctor know if you are allergic to chlorhexidine.    Why do I need to take a preoperative antibacterial shower?  Skin is not sterile.  It is best to try to make your skin as free of germs as possible before surgery.  Proper cleansing with a germ killing soap before surgery can lower the number of germs on your skin.  This helps to reduce the risk of infection at the surgical site.  Following the instructions listed below will help you prepare your skin for surgery.      How do I use the CHG skin cleanser?  Steps:  Begin using your CHG soap five days before your scheduled surgery on ________________________.    Days 1-4 Shower before bed:  Wash your face and genitals with your normal soap and rinse.           2.    Apply the CHG soap to a clean wet washcloth.  Turn the water off or move away                From the water spray to avoid premature rinsing of the CHG soap as you are applying.     3.   Lather your entire body from the neck down.  Do not use on your face or genitals.  4. Pay special attention to the area(s) where your incision(s) will be located unless they are on your face.  Avoid scrubbing your skin too hard.  The important point is to have the CHG soap sit on your skin for 3 minutes.    When the 3 minutes are up, turn on the water and rinse the CHG soap off your body completely.   Pat yourself dry with a clean, freshly-laundered  towel.  Dress in clean, freshly laundered night clothes.    Be sure to change bed sheets and blankets at least on the first night of CHG body wash use. May change linens every night of the above protocol for maximum benefit.   Day 5:  Last shower is the morning of surgery: Follow above Instructions.    NOTE:        *Keep CHG soap out of eyes and ear canals   *DO NOT wash with regular soap on your body after you have used the CHG soap solution  *DO NOT apply powders, lotions, or perfume.  *Deodorant may be used days 1-4, BUT NOT the day of surgery          Patient Information: Pre-Operative Infection Prevention Measures     Why did I have my nose, under my arms and groin swabbed?  The purpose of the swab is to identify Staphylococcus aureus inside your nose or on your skin.  The swab was sent to the laboratory for culture.  A positive swab/culture for Staphylococcus aureus is called colonization or carriage.      What is Staphylococcus aureus?  Staphylococcus aureus, also known as “staph”, is a germ found on the skin or in the nose of healthy people.  Sometimes Staphylococcus aureus can get into the body and cause an infection.  This can be minor (such as pimples, boils or other skin problems).  It might also be serious (such as blood infection, pneumonia or a surgical site infection).    What is Staphylococcus aureus colonization or carriage?  Colonization or carriage means that a person has the germ but is not sick from it.  These bacteria can be spread on the hands or when breathing or sneezing.    How is Staphylococcus aureus spread?  It is most often spread by close contact with a person or item that carries it.    What happens if my culture is positive for Staphylococcus aureus?  Your doctor/medical team will use this information to guide any antibiotic treatment which may be necessary.  Regardless of the culture results, we will clean the inside of your nose with a betadine swab just before you have your  surgery.      Will I get an infection if I have Staphylococcus aureus in my nose or on my skin?  Anyone can get an infection with Staphylococcus aureus.  However, the best way to reduce your risk of infection is to follow the instructions provided to you for the use of your CHG soap and dental rinse.            Patient Information: Oral/Dental Rinse  **This is a prescription; pick it up at your preferred local pharmacy **  What is oral/dental rinse?   It is a mouthwash. It is a way of cleaning the mouth with a germ killing solution before your surgery.  The solution contains chlorhexidine, commonly known as CHG.   It is used inside the mouth to kill a bacteria known as Staphylococcus aureus.  Let your doctor know if you are allergic to Chlorhexidine.    Why do I need to use CHG oral/dental rinse?  The CHG oral/dental rinse helps to kill a bacteria in your mouth known a Staphylococcus aureus.     This reduces the risk of infection at the surgical site.      Using your CHG oral/dental rinse  STEPS:  Use your CHG oral/dental rinse after you brush your teeth the night before (at bedtime) and the morning of your surgery.  Follow all directions on your prescription label.    Use the cap on the container to measure 15ml (fill cap to fill line)  Swish (gargle if you can) the mouthwash in your mouth for at least 30 seconds, (do not to swallow) spit out  After you use your CHG rinse, do not rinse your mouth with water, drink or eat.  Please refer to prescription label for the appropriate time to resume oral intake  Dental rinse comes in one size bottle: 473ml ~16oz.  You will have leftover    rinse, discard after this use.    What side effects might I have using the CHG oral/dental rinse?  CHG rinse will stick to plaque on the teeth.  Brush and floss just before use.  Teeth brushing will help avoid staining of plaque during use.           The Week before Surgery        Seven days before Surgery  Check your [facility] medication  instructions  Do the exercises provided to you by C[facility]   Arrange for a responsible, adult licensed  to take you home after surgery and stay with you for 24 hours.  You will not be permitted to drive yourself home if you have received any anesthetic/sedation  Five days before surgery  Check your [facility] medication instructions  Do the exercises provided to you by [facility]   Start using Chlorhexidene (CHG) body wash if prescribed (Continue till day of surgery)      The Day before Surgery       Check your [facility] medication and all other [facility] instructions including when to stop eating and drinking  You will be called with your arrival time for surgery in the late afternoon.  If you do not receive a call please reach out to your surgeon's office.  Do not smoke or drink 24 hours before surgery  Prepare items to bring with you to the hospital  Shower with your chlorhexidine wash if prescribed  Brush your teeth and use your chlorhexidine dental rinse if prescribed    The Day of Surgery       Check your [facility] medication instructions  Ensure you follow the instructions for when to stop eating and drinking  Shower, if prescribed use CHG.  Do not apply any lotions, creams, moisturizers, perfume or deodorant  Brush your teeth and use your CHG dental rinse if prescribed  Wear loose comfortable clothing  Avoid make-up  Remove  jewelry and piercings, consider professional piercing removal with a plastic spacer if needed  Bring photo ID and Insurance card  Bring an accurate medication list that includes medication dose, frequency and allergies  Bring a copy of your advanced directives (will, health care power of )  Bring any devices and controllers as well as medical devices you have been provided with for surgery (CPAP, slings, braces, etc.)  Dentures, eyeglasses, and contacts will be removed before surgery, please bring cases for contacts or glasses    Preoperative Deep Breathing Exercises    Why it is important to do deep breathing  exercises before my surgery?  Deep breathing exercises strengthen your breathing muscles.  This helps you to recover after your surgery and decreases the chance of breathing complications.    How are the deep breathing exercises done?  Sit straight with your back supported.  Breathe in deeply and slowly through your nose. Your lower rib cage should expand and your abdomen may move forward.  Hold that breath for 3 to 5 seconds.  Breathe out through pursed lips, slowly and completely.  Rest and repeat 10 times every hour while awake.  Rest longer if you become dizzy or lightheaded.      Preoperative Brain Exercises    What are brain exercises?  A brain exercise is any activity that engages your thinking (cognitive) skills.    What types of activities are considered brain exercises?  Jigsaw puzzles, crossword puzzles, word jumble, memory games, word search, and many more.  Many can be found free online or on your phone via a mobile napoleon.    Why should I do brain exercises before my surgery?  More recent research has shown brain exercise before surgery can lower the risk of postoperative delirium (confusion) which can be especially important for older adults.  Patients who did brain exercises for 5 to 10 hours the days before surgery, cut their risk of postoperative delirium in half up to 1 week after surgery.    Sit-to-Stand Exercise    What is the sit-to-stand exercise?  The sit-to-stand exercise strengthens the muscles of your lower body and muscles in the center of your body (core muscles for stability) helping to maintain and improve your strength and mobility.  How do I do the sit-to-stand exercise?  The goal is to do this exercise without using your arms or hands.  If this is too difficult, use your arms and hands or a chair with armrests to help slowly push yourself to the standing position and lower yourself back to the sitting position. As the movement becomes easier use your arms and hands less.    Steps to  the sit-to-stand exercise  Sit up tall in a sturdy chair, knees bent, feet flat on the floor shoulder-width apart.  Shift your hips/pelvis forward in the chair to correctly position yourself for the next movement.  Lean forward at your hips.  Stand up straight putting equal weight on both feet.  Check to be sure you are properly aligned with the chair, in a slow controlled movement sit back down.  Repeat this exercise 10-15 times.  If needed you can do it fewer times until your strength improves.  Rest for 1 minute.  Do another 10-15 sit-to-stand exercises.  Try to do this in the morning and evening.       Simple things you can do to help prevent blood clots     Blood clots are blockages that can form in the body's veins. When a blood clot forms in your deep veins, it may be called a deep vein thrombosis, or DVT for short. Blood clots can happen in any part of the body where blood flows, but they are most common in the arms and legs. If a piece of a blood clot breaks free and travels to the lungs, it is called a pulmonary embolus (PE). A PE can be a very serious problem.      Being in the hospital or having surgery can raise your chances of getting a blood clot because you may not be well enough to move around as much as you normally do.         Ways you can help prevent blood clots in the hospital       Wearing SCDs  SCDs stands for Sequential Compression Devices.   SCDs are special sleeves that wrap around your legs. They attach to a pump that fills them with air to gently squeeze your legs every few minutes.  This helps return the blood in your legs to your heart.   SCDs should only be taken off when walking or bathing. SCDs may not be comfortable, but they can help save your life.              Pump SCD leg sleeves  Wearing compression stockings - if your doctor orders them. These special snug-fitting stockings gently squeeze your legs to help blood flow.       Walking. Walking helps move the blood in your legs.    If your doctor says it is ok, try walking the halls at least   5 times a day. Ask us to help you get up, so you don't fall.      Taking any blood-thinning medicines your doctor orders.              Ways you can help prevent blood clots at home         Wearing compression stockings - if your doctor orders them.   Walking - to help move the blood in your legs.    Taking any blood-thinning medicines your doctor orders.      Signs of a blood clot or PE    Tell your doctor or nurse right away if you have any of the problems listed below.         If you are at home, seek medical care right away. Call 911 for chest pain or problems breathing.            Signs of a blood clot (DVT) - such as pain, swelling, redness, or warmth in your arm or legs.  Signs of a pulmonary embolism (PE) - such as chest pain or feeling short of breath

## 2025-06-06 DIAGNOSIS — I10 ESSENTIAL HYPERTENSION: ICD-10-CM

## 2025-06-06 LAB
EST. AVERAGE GLUCOSE BLD GHB EST-MCNC: 94 MG/DL
EST. AVERAGE GLUCOSE BLD GHB EST-SCNC: 5.2 MMOL/L
HBA1C MFR BLD: 4.9 %
STAPHYLOCOCCUS SPEC CULT: NORMAL

## 2025-06-06 RX ORDER — AMLODIPINE BESYLATE 2.5 MG/1
2.5 TABLET ORAL DAILY
Qty: 90 TABLET | Refills: 3 | Status: SHIPPED | OUTPATIENT
Start: 2025-06-06

## 2025-06-12 ENCOUNTER — APPOINTMENT (OUTPATIENT)
Facility: CLINIC | Age: 65
End: 2025-06-12
Payer: MEDICARE

## 2025-06-12 ENCOUNTER — APPOINTMENT (OUTPATIENT)
Dept: OTOLARYNGOLOGY | Facility: CLINIC | Age: 65
End: 2025-06-12
Payer: MEDICARE

## 2025-06-12 DIAGNOSIS — H05.402 ENOPHTHALMOS OF LEFT EYE: Primary | ICD-10-CM

## 2025-06-12 DIAGNOSIS — D23.111 PAPILLOMA OF RIGHT UPPER EYELID: ICD-10-CM

## 2025-06-12 DIAGNOSIS — D23.121: ICD-10-CM

## 2025-06-12 PROCEDURE — 99213 OFFICE O/P EST LOW 20 MIN: CPT

## 2025-06-12 ASSESSMENT — CUP TO DISC RATIO
OD_RATIO: 0.6
OS_RATIO: 0.6

## 2025-06-12 ASSESSMENT — ENCOUNTER SYMPTOMS
GASTROINTESTINAL NEGATIVE: 0
ENDOCRINE NEGATIVE: 0
MUSCULOSKELETAL NEGATIVE: 0
ALLERGIC/IMMUNOLOGIC NEGATIVE: 0
PSYCHIATRIC NEGATIVE: 0
RESPIRATORY NEGATIVE: 0
CONSTITUTIONAL NEGATIVE: 0
HEMATOLOGIC/LYMPHATIC NEGATIVE: 0
NEUROLOGICAL NEGATIVE: 0
CARDIOVASCULAR NEGATIVE: 0
EYES NEGATIVE: 1

## 2025-06-12 ASSESSMENT — EXTERNAL EXAM - RIGHT EYE: OD_EXAM: NORMAL

## 2025-06-12 ASSESSMENT — TONOMETRY
OD_IOP_MMHG: 16
OS_IOP_MMHG: 16
IOP_METHOD: GOLDMANN APPLANATION

## 2025-06-12 ASSESSMENT — VISUAL ACUITY
OD_CC: 20/20
OS_PH_CC: 20/20
METHOD: SNELLEN - LINEAR
OS_CC: 20/30-2

## 2025-06-12 ASSESSMENT — CONF VISUAL FIELD
OD_INFERIOR_NASAL_RESTRICTION: 0
OD_SUPERIOR_NASAL_RESTRICTION: 0
OS_SUPERIOR_TEMPORAL_RESTRICTION: 0
OS_INFERIOR_TEMPORAL_RESTRICTION: 0
OS_NORMAL: 1
OS_INFERIOR_NASAL_RESTRICTION: 0
OS_SUPERIOR_NASAL_RESTRICTION: 0
OD_SUPERIOR_TEMPORAL_RESTRICTION: 0
OD_INFERIOR_TEMPORAL_RESTRICTION: 0
OD_NORMAL: 1

## 2025-06-12 NOTE — PROGRESS NOTES
"Lesion RUL lateral inferior to brow s/p excision 4/29/25   Measured 2.4 mm. Pathology report dx: seborrheic keratosis.   Doing well after RUL lesion excision at last visit 4/29/25  Healing well; pt satisfied  Can stop ointment now; no restrictions    Also has other eyelid lesions which were not excised (per pt preference) and do not appear concerning  Medial RUL    measures 1.4mm  Present x several years  Some increase in size over past few years  Overlies canaliculus  no madarosis/ulceration/telangiectasia  Will monitor; pt to report any symptoms like redness/inflammation/bleeding  BAILEY central margin 3 mm  Present since grade school  No significant changes over time  no madarosis/ulceration/telangiectasia  Will monitor; pt to report any symptoms like redness/inflammation/bleeding      Left eye enophthalmos   Incidentally noted to have ocular misalignment on exam 4/29/25, with supraduction and abduction deficits OS. No evidence of optic neuropathy. VA good, IOP good, CP full, PERRL no APD. 6RHT and 2LET in primary gaze. Feb onset of binocular diplopia. No hx of trauma. No hx of strab or patching.  Pt still endorsing binocular vertical diplopia unchanged from last visit   Pt has prisms in glasses   MRI brain and orbits (5/27/25) with \"posttraumatic\" changes in the floor of the left orbit with inferior herniation of extraconal fat into the left maxillary sinus. There is opacification and enhancement in the left ethmoid air cells and left frontal sinus   CT sinus without contrast (6/4/25) with findings suggestive of silent sinus syndrome without evidence of fracture   Fransisca 6/12/25: OD 23, OS 19, base 95  Seen ENT (Dr. Royal) 6/3/25 - suspect silent sinus syndrome OS   Planned for sinus surgery on 6/17    Assessment/Plan  Eyelid lesions RUL and BAILEY   Lesion on the RIGHT UPPER eyelid x 1 and of the LEFT UPPER eyelid x 1. Pt not concerned about medial RUL lesion and BAILEY lesion and not interested in excision after " r/b/a discussion, which focused on lacrimal system damage and margin notching for each lesion, respectively. Will monitor. Pt to report any symptoms like redness/inflammation/bleeding.    Left enophthalmos   Continued use of prism in glasses for diplopia management   ENT endoscopic sinus surgery scheduled for 6/17/25 to evaluate possible silent sinus syndrome OS   Case discussed with Dr. Royal. Consensus is to first repair sinuses and then evaluate how pt heals before determining if orbitotomy with implant is necessary.   F/U in 3months to give ample time to recover from surgery.     Large C:D OU  No personal for family hx of glaucoma. Good IOP today. C:D 0.6 OU.   Refer to comprehensive ophthalmology for further testing/monitoring of glaucoma suspect.    RTC 3 months. Sooner with any acute changes or worsening.

## 2025-06-16 ENCOUNTER — ANESTHESIA EVENT (OUTPATIENT)
Dept: OPERATING ROOM | Facility: HOSPITAL | Age: 65
End: 2025-06-16
Payer: MEDICARE

## 2025-06-17 ENCOUNTER — ANESTHESIA (OUTPATIENT)
Dept: OPERATING ROOM | Facility: HOSPITAL | Age: 65
End: 2025-06-17
Payer: MEDICARE

## 2025-06-17 ENCOUNTER — HOSPITAL ENCOUNTER (OUTPATIENT)
Facility: HOSPITAL | Age: 65
Setting detail: OUTPATIENT SURGERY
Discharge: HOME | End: 2025-06-17
Attending: OTOLARYNGOLOGY | Admitting: OTOLARYNGOLOGY
Payer: MEDICARE

## 2025-06-17 VITALS
BODY MASS INDEX: 24.91 KG/M2 | HEIGHT: 67 IN | RESPIRATION RATE: 16 BRPM | WEIGHT: 158.73 LBS | DIASTOLIC BLOOD PRESSURE: 82 MMHG | OXYGEN SATURATION: 100 % | TEMPERATURE: 97.9 F | SYSTOLIC BLOOD PRESSURE: 177 MMHG | HEART RATE: 57 BPM

## 2025-06-17 DIAGNOSIS — J32.8 OTHER CHRONIC SINUSITIS: Primary | ICD-10-CM

## 2025-06-17 PROCEDURE — 7100000001 HC RECOVERY ROOM TIME - INITIAL BASE CHARGE: Performed by: OTOLARYNGOLOGY

## 2025-06-17 PROCEDURE — 7100000010 HC PHASE TWO TIME - EACH INCREMENTAL 1 MINUTE: Performed by: OTOLARYNGOLOGY

## 2025-06-17 PROCEDURE — A31267 PR NASAL/SINUS ENDOSCOPY,RMV TISS MAXILL SINUS

## 2025-06-17 PROCEDURE — 3700000001 HC GENERAL ANESTHESIA TIME - INITIAL BASE CHARGE: Performed by: OTOLARYNGOLOGY

## 2025-06-17 PROCEDURE — 2500000001 HC RX 250 WO HCPCS SELF ADMINISTERED DRUGS (ALT 637 FOR MEDICARE OP): Performed by: OTOLARYNGOLOGY

## 2025-06-17 PROCEDURE — 2500000004 HC RX 250 GENERAL PHARMACY W/ HCPCS (ALT 636 FOR OP/ED)

## 2025-06-17 PROCEDURE — 2720000007 HC OR 272 NO HCPCS: Performed by: OTOLARYNGOLOGY

## 2025-06-17 PROCEDURE — 31253 NSL/SINS NDSC TOTAL: CPT | Performed by: OTOLARYNGOLOGY

## 2025-06-17 PROCEDURE — 3600000018 HC OR TIME - INITIAL BASE CHARGE - PROCEDURE LEVEL SIX: Performed by: OTOLARYNGOLOGY

## 2025-06-17 PROCEDURE — 0753T DGTZ GLS MCRSCP SLD LEVEL IV: CPT | Mod: TC,SUR | Performed by: OTOLARYNGOLOGY

## 2025-06-17 PROCEDURE — 3700000002 HC GENERAL ANESTHESIA TIME - EACH INCREMENTAL 1 MINUTE: Performed by: OTOLARYNGOLOGY

## 2025-06-17 PROCEDURE — A31267 PR NASAL/SINUS ENDOSCOPY,RMV TISS MAXILL SINUS: Performed by: ANESTHESIOLOGY

## 2025-06-17 PROCEDURE — 31267 ENDOSCOPY MAXILLARY SINUS: CPT | Performed by: OTOLARYNGOLOGY

## 2025-06-17 PROCEDURE — 2500000004 HC RX 250 GENERAL PHARMACY W/ HCPCS (ALT 636 FOR OP/ED): Performed by: OTOLARYNGOLOGY

## 2025-06-17 PROCEDURE — 88305 TISSUE EXAM BY PATHOLOGIST: CPT | Performed by: PATHOLOGY

## 2025-06-17 PROCEDURE — 2500000004 HC RX 250 GENERAL PHARMACY W/ HCPCS (ALT 636 FOR OP/ED): Mod: JZ,TB | Performed by: ANESTHESIOLOGY

## 2025-06-17 PROCEDURE — 7100000009 HC PHASE TWO TIME - INITIAL BASE CHARGE: Performed by: OTOLARYNGOLOGY

## 2025-06-17 PROCEDURE — 61782 SCAN PROC CRANIAL EXTRA: CPT | Performed by: OTOLARYNGOLOGY

## 2025-06-17 PROCEDURE — 7100000002 HC RECOVERY ROOM TIME - EACH INCREMENTAL 1 MINUTE: Performed by: OTOLARYNGOLOGY

## 2025-06-17 PROCEDURE — 3600000017 HC OR TIME - EACH INCREMENTAL 1 MINUTE - PROCEDURE LEVEL SIX: Performed by: OTOLARYNGOLOGY

## 2025-06-17 PROCEDURE — 2500000005 HC RX 250 GENERAL PHARMACY W/O HCPCS: Performed by: OTOLARYNGOLOGY

## 2025-06-17 RX ORDER — SODIUM CHLORIDE 0.9 G/100ML
INJECTION, SOLUTION IRRIGATION AS NEEDED
Status: DISCONTINUED | OUTPATIENT
Start: 2025-06-17 | End: 2025-06-17 | Stop reason: HOSPADM

## 2025-06-17 RX ORDER — OXYMETAZOLINE HCL 0.05 %
SPRAY, NON-AEROSOL (ML) NASAL AS NEEDED
Status: DISCONTINUED | OUTPATIENT
Start: 2025-06-17 | End: 2025-06-17 | Stop reason: HOSPADM

## 2025-06-17 RX ORDER — OXYCODONE HYDROCHLORIDE 5 MG/1
5 TABLET ORAL EVERY 4 HOURS PRN
Status: DISCONTINUED | OUTPATIENT
Start: 2025-06-17 | End: 2025-06-17 | Stop reason: HOSPADM

## 2025-06-17 RX ORDER — SODIUM CHLORIDE, SODIUM LACTATE, POTASSIUM CHLORIDE, CALCIUM CHLORIDE 600; 310; 30; 20 MG/100ML; MG/100ML; MG/100ML; MG/100ML
100 INJECTION, SOLUTION INTRAVENOUS CONTINUOUS
Status: DISCONTINUED | OUTPATIENT
Start: 2025-06-17 | End: 2025-06-17 | Stop reason: HOSPADM

## 2025-06-17 RX ORDER — LIDOCAINE HYDROCHLORIDE AND EPINEPHRINE 10; 10 UG/ML; MG/ML
INJECTION, SOLUTION INFILTRATION; PERINEURAL AS NEEDED
Status: DISCONTINUED | OUTPATIENT
Start: 2025-06-17 | End: 2025-06-17 | Stop reason: HOSPADM

## 2025-06-17 RX ORDER — MIDAZOLAM HYDROCHLORIDE 1 MG/ML
INJECTION INTRAMUSCULAR; INTRAVENOUS AS NEEDED
Status: DISCONTINUED | OUTPATIENT
Start: 2025-06-17 | End: 2025-06-17

## 2025-06-17 RX ORDER — HYDROMORPHONE HYDROCHLORIDE 1 MG/ML
INJECTION, SOLUTION INTRAMUSCULAR; INTRAVENOUS; SUBCUTANEOUS AS NEEDED
Status: DISCONTINUED | OUTPATIENT
Start: 2025-06-17 | End: 2025-06-17

## 2025-06-17 RX ORDER — ONDANSETRON HYDROCHLORIDE 2 MG/ML
4 INJECTION, SOLUTION INTRAVENOUS ONCE AS NEEDED
Status: DISCONTINUED | OUTPATIENT
Start: 2025-06-17 | End: 2025-06-17 | Stop reason: HOSPADM

## 2025-06-17 RX ORDER — HYDROMORPHONE HYDROCHLORIDE 0.2 MG/ML
0.2 INJECTION INTRAMUSCULAR; INTRAVENOUS; SUBCUTANEOUS EVERY 5 MIN PRN
Status: DISCONTINUED | OUTPATIENT
Start: 2025-06-17 | End: 2025-06-17 | Stop reason: HOSPADM

## 2025-06-17 RX ORDER — HYDRALAZINE HYDROCHLORIDE 20 MG/ML
5 INJECTION INTRAMUSCULAR; INTRAVENOUS EVERY 30 MIN PRN
Status: DISCONTINUED | OUTPATIENT
Start: 2025-06-17 | End: 2025-06-17 | Stop reason: HOSPADM

## 2025-06-17 RX ORDER — CEFAZOLIN 1 G/1
INJECTION, POWDER, FOR SOLUTION INTRAVENOUS AS NEEDED
Status: DISCONTINUED | OUTPATIENT
Start: 2025-06-17 | End: 2025-06-17

## 2025-06-17 RX ORDER — LIDOCAINE HYDROCHLORIDE 10 MG/ML
0.1 INJECTION, SOLUTION INFILTRATION; PERINEURAL ONCE
Status: DISCONTINUED | OUTPATIENT
Start: 2025-06-17 | End: 2025-06-17 | Stop reason: HOSPADM

## 2025-06-17 RX ORDER — METOPROLOL TARTRATE 1 MG/ML
INJECTION, SOLUTION INTRAVENOUS AS NEEDED
Status: DISCONTINUED | OUTPATIENT
Start: 2025-06-17 | End: 2025-06-17

## 2025-06-17 RX ORDER — ROCURONIUM BROMIDE 10 MG/ML
INJECTION, SOLUTION INTRAVENOUS AS NEEDED
Status: DISCONTINUED | OUTPATIENT
Start: 2025-06-17 | End: 2025-06-17

## 2025-06-17 RX ORDER — FENTANYL CITRATE 50 UG/ML
INJECTION, SOLUTION INTRAMUSCULAR; INTRAVENOUS AS NEEDED
Status: DISCONTINUED | OUTPATIENT
Start: 2025-06-17 | End: 2025-06-17

## 2025-06-17 RX ORDER — LIDOCAINE HYDROCHLORIDE 20 MG/ML
INJECTION, SOLUTION INFILTRATION; PERINEURAL AS NEEDED
Status: DISCONTINUED | OUTPATIENT
Start: 2025-06-17 | End: 2025-06-17

## 2025-06-17 RX ORDER — ONDANSETRON HYDROCHLORIDE 2 MG/ML
INJECTION, SOLUTION INTRAVENOUS AS NEEDED
Status: DISCONTINUED | OUTPATIENT
Start: 2025-06-17 | End: 2025-06-17

## 2025-06-17 RX ORDER — PROPOFOL 10 MG/ML
INJECTION, EMULSION INTRAVENOUS AS NEEDED
Status: DISCONTINUED | OUTPATIENT
Start: 2025-06-17 | End: 2025-06-17

## 2025-06-17 RX ADMIN — HYDROMORPHONE HYDROCHLORIDE 0.5 MG: 0.5 INJECTION, SOLUTION INTRAMUSCULAR; INTRAVENOUS; SUBCUTANEOUS at 13:51

## 2025-06-17 RX ADMIN — FENTANYL CITRATE 50 MCG: 50 INJECTION, SOLUTION INTRAMUSCULAR; INTRAVENOUS at 12:20

## 2025-06-17 RX ADMIN — ROCURONIUM BROMIDE 50 MG: 10 INJECTION, SOLUTION INTRAVENOUS at 12:02

## 2025-06-17 RX ADMIN — HYDRALAZINE HYDROCHLORIDE 5 MG: 20 INJECTION INTRAMUSCULAR; INTRAVENOUS at 13:44

## 2025-06-17 RX ADMIN — ROCURONIUM BROMIDE 10 MG: 10 INJECTION, SOLUTION INTRAVENOUS at 12:41

## 2025-06-17 RX ADMIN — SODIUM CHLORIDE, SODIUM LACTATE, POTASSIUM CHLORIDE, AND CALCIUM CHLORIDE: 600; 310; 30; 20 INJECTION, SOLUTION INTRAVENOUS at 11:53

## 2025-06-17 RX ADMIN — MIDAZOLAM HYDROCHLORIDE 2 MG: 2 INJECTION, SOLUTION INTRAMUSCULAR; INTRAVENOUS at 11:53

## 2025-06-17 RX ADMIN — CEFAZOLIN 2 G: 1 INJECTION, POWDER, FOR SOLUTION INTRAMUSCULAR; INTRAVENOUS at 12:15

## 2025-06-17 RX ADMIN — SUGAMMADEX 200 MG: 100 INJECTION, SOLUTION INTRAVENOUS at 12:58

## 2025-06-17 RX ADMIN — HYDROMORPHONE HYDROCHLORIDE 0.5 MG: 1 INJECTION, SOLUTION INTRAMUSCULAR; INTRAVENOUS; SUBCUTANEOUS at 12:37

## 2025-06-17 RX ADMIN — PROPOFOL 140 MG: 10 INJECTION, EMULSION INTRAVENOUS at 12:02

## 2025-06-17 RX ADMIN — HYDROMORPHONE HYDROCHLORIDE 0.2 MG: 0.2 INJECTION, SOLUTION INTRAMUSCULAR; INTRAVENOUS; SUBCUTANEOUS at 14:03

## 2025-06-17 RX ADMIN — HYDROMORPHONE HYDROCHLORIDE 0.5 MG: 1 INJECTION, SOLUTION INTRAMUSCULAR; INTRAVENOUS; SUBCUTANEOUS at 12:29

## 2025-06-17 RX ADMIN — DEXAMETHASONE SODIUM PHOSPHATE 8 MG: 4 INJECTION INTRA-ARTICULAR; INTRALESIONAL; INTRAMUSCULAR; INTRAVENOUS; SOFT TISSUE at 12:18

## 2025-06-17 RX ADMIN — ONDANSETRON 4 MG: 2 INJECTION, SOLUTION INTRAMUSCULAR; INTRAVENOUS at 13:01

## 2025-06-17 RX ADMIN — METOPROLOL TARTRATE 5 MG: 5 INJECTION, SOLUTION INTRAVENOUS at 13:23

## 2025-06-17 RX ADMIN — FENTANYL CITRATE 50 MCG: 50 INJECTION, SOLUTION INTRAMUSCULAR; INTRAVENOUS at 12:02

## 2025-06-17 RX ADMIN — PROPOFOL 50 MCG/KG/MIN: 10 INJECTION, EMULSION INTRAVENOUS at 12:28

## 2025-06-17 RX ADMIN — LIDOCAINE HYDROCHLORIDE 100 MG: 20 INJECTION, SOLUTION INFILTRATION; PERINEURAL at 12:02

## 2025-06-17 SDOH — HEALTH STABILITY: MENTAL HEALTH: CURRENT SMOKER: 0

## 2025-06-17 ASSESSMENT — COLUMBIA-SUICIDE SEVERITY RATING SCALE - C-SSRS
6. HAVE YOU EVER DONE ANYTHING, STARTED TO DO ANYTHING, OR PREPARED TO DO ANYTHING TO END YOUR LIFE?: NO
1. IN THE PAST MONTH, HAVE YOU WISHED YOU WERE DEAD OR WISHED YOU COULD GO TO SLEEP AND NOT WAKE UP?: NO
2. HAVE YOU ACTUALLY HAD ANY THOUGHTS OF KILLING YOURSELF?: NO

## 2025-06-17 ASSESSMENT — PAIN SCALES - GENERAL
PAINLEVEL_OUTOF10: 7
PAIN_LEVEL: 4
PAINLEVEL_OUTOF10: 3
PAINLEVEL_OUTOF10: 0 - NO PAIN
PAINLEVEL_OUTOF10: 3
PAINLEVEL_OUTOF10: 3
PAINLEVEL_OUTOF10: 5 - MODERATE PAIN
PAINLEVEL_OUTOF10: 0 - NO PAIN

## 2025-06-17 ASSESSMENT — PAIN - FUNCTIONAL ASSESSMENT
PAIN_FUNCTIONAL_ASSESSMENT: 0-10

## 2025-06-17 ASSESSMENT — PAIN DESCRIPTION - DESCRIPTORS
DESCRIPTORS: BURNING

## 2025-06-17 ASSESSMENT — PAIN DESCRIPTION - LOCATION
LOCATION: NOSE
LOCATION: NOSE

## 2025-06-17 NOTE — ANESTHESIA PREPROCEDURE EVALUATION
"Patient: Merlin Coleman \"Gene\"    Procedure Information       Date/Time: 06/17/25 1105    Procedures:       ENDOSCOPIC SINUS SURGERY (Bilateral)      IMAGE GUIDANCE (Bilateral)    Location: [facility] OR  / [facility] [place]    Surgeons: Loco GARAY MD            Relevant Problems   Cardiac   (+) Aortic valve stenosis   (+) Atherosclerosis of coronary artery   (+) Bicuspid aortic valve   (+) Essential hypertension   (+) Hyperlipidemia   (+) Supraventricular tachycardia      Pulmonary   (+) Pulmonary nodules      Liver   (+) Disorder of liver   (+) Liver nodule      Hematology   (+) Disorder involving thrombocytopenia      HEENT   (+) Other chronic sinusitis   (+) Visual impairment       Clinical information reviewed:   Tobacco  Allergies  Meds   Med Hx  Surg Hx   Fam Hx  Soc Hx        NPO Detail:  NPO/Void Status  Date of Last Liquid: 06/16/25  Time of Last Liquid: 2359  Date of Last Solid: 06/16/25  Time of Last Solid: 2359         Physical Exam    Airway  Mallampati: I  TM distance: >3 FB  Neck ROM: full     Cardiovascular - normal exam   Dental - normal exam     Pulmonary - normal exam   Abdominal - normal exam           Anesthesia Plan    History of general anesthesia?: yes  History of complications of general anesthesia?: no    ASA 3     general     The patient is not a current smoker.  Patient was not previously instructed to abstain from smoking on day of procedure.  Patient did not smoke on day of procedure.    intravenous induction   Postoperative pain plan includes opioids.      "

## 2025-06-17 NOTE — ANESTHESIA POSTPROCEDURE EVALUATION
"Patient: Merlin Coleman \"Gene\"    Procedure Summary       Date: 06/17/25 Room / Location: [facility] OR 04 / [facility] [place]    Anesthesia Start: 1153 Anesthesia Stop: 1323    Procedures:       ENDOSCOPIC SINUS SURGERY (Bilateral: Nose)      IMAGE GUIDANCE (Bilateral: Nose) Diagnosis:       Other chronic sinusitis      (Other chronic sinusitis [J32.8])    Surgeons: Loco GARAY MD Responsible Provider: Serafin Aleman MD    Anesthesia Type: general ASA Status: 3            Anesthesia Type: general    Vitals Value Taken Time   /74 06/17/25 14:15   Temp 36.1 °C (97 °F) 06/17/25 13:20   Pulse 55 06/17/25 14:20   Resp 5 06/17/25 14:20   SpO2 99 % 06/17/25 14:20   Vitals shown include unfiled device data.    Anesthesia Post Evaluation    Patient location during evaluation: PACU  Patient participation: complete - patient participated  Level of consciousness: awake and alert  Pain score: 4  Pain management: adequate  Airway patency: patent  Cardiovascular status: acceptable  Respiratory status: acceptable  Hydration status: acceptable  Postoperative Nausea and Vomiting: none        There were no known notable events for this encounter.    "

## 2025-06-17 NOTE — OP NOTE
"ENDOSCOPIC SINUS SURGERY (B), IMAGE GUIDANCE (B) Operative Note     Date: 2025  OR Location: [facility] OR    Name: Merlin Coleman \"Gene\", : 1960, Age: 65 y.o., MRN: [id number], Sex: male    Diagnosis  Pre-op Diagnosis      * Other chronic sinusitis [J32.8] Post-op Diagnosis     * Other chronic sinusitis [J32.8]     Procedures  ENDOSCOPIC SINUS SURGERY  66298 - KS NSL/SINUS NDSC MAX ANTROST W/RMVL TISS MAX SINUS    IMAGE GUIDANCE  02548 - KS STRTCTC CPTR ASSTD PX EXTRADURAL CRANIAL    KS NASAL/SINUS NDSC TOT W/FRNT SINS EXPL TISS RMVL [09235]  Surgeons      * Loco Royal V - Primary    Resident/Fellow/Other Assistant:  Surgeons and Role:     * Katy Melendez MD - Resident - Assisting    Staff:   Circulator: Valeria  Scrub Person: Shahnaz Johnson Scrub: Kostas Johnson Circulator: Kostas    Anesthesia Staff: Anesthesiologist: Serafin Aleman MD  C-AA: HANSEL Jim    Procedure Summary  Anesthesia: General  ASA: III  Estimated Blood Loss: 25mL  Intra-op Medications:   Administrations occurring from 1105 to 1315 on 25:   Medication Name Total Dose   lidocaine-epinephrine (Xylocaine W/EPI) 1 %-1:100,000 injection 4 mL   sodium chloride 0.9 % irrigation solution 1,000 mL   oxymetazoline (Afrin) 0.05 % nasal spray 30 mL   ceFAZolin (Ancef) 1 g 2 g   dexAMETHasone (Decadron) 4 mg/mL 8 mg   fentaNYL (Sublimaze) injection 50 mcg/mL 100 mcg   HYDROmorphone (Dilaudid) injection 1 mg/mL 1 mg   LR bolus Cannot be calculated   lidocaine (Xylocaine) 2 % 100 mg   midazolam PF (Versed) injection 1 mg/mL 2 mg   ondansetron 2 mg/mL 4 mg   propofol (Diprivan) injection 10 mg/mL 282.2 mg   rocuronium 10 mg/mL 60 mg   sugammadex (Bridion) 200 mg/2 mL injection 200 mg              Anesthesia Record               Intraprocedure I/O Totals       None           Specimen: No specimens collected              Drains and/or Catheters: * None in log *    Findings: Atelectatic left uncinate process. Bony " "dehiscence of left anterior lamina papyracea-- no periorbital defect.     Indications: Merlin Coleman \"Gene\" is an 65 y.o. male who is having surgery for Other chronic sinusitis [J32.8].     The patient was seen in the preoperative area. The risks, benefits, complications, treatment options, non-operative alternatives, expected recovery and outcomes were discussed with the patient. The possibilities of reaction to medication, pulmonary aspiration, injury to surrounding structures, bleeding, recurrent infection, the need for additional procedures, failure to diagnose a condition, and creating a complication requiring transfusion or operation were discussed with the patient. The patient concurred with the proposed plan, giving informed consent.  The site of surgery was properly noted/marked if necessary per policy. The patient has been actively warmed in preoperative area. Preoperative antibiotics have been ordered and given within 1 hours of incision. Venous thrombosis prophylaxis have been ordered including bilateral sequential compression devices    Procedure Details: The patient was identified in the preoperative holding area and brought to the operating room.  He was laid supine on the operating room table.  Smooth induction of general endotracheal anesthesia was performed.  The patient was prepped and draped in the regular sterile manner.  The [employer] image guidance system was registered to the patient's facial landmarks using his preoperative imaging.  It was noted to be functioning within 2 mm of accuracy throughout the case was used to identify pertinent neurovascular and bony landmarks.  A timeout was performed in which the correct patient procedure and other pertinent information was confirmed the operating room staff.    To begin we utilized a 0 degree endoscope to visualize the patient's bilateral sinonasal cavity.  There were no obvious abnormalities on the right side.  On the left side the " uncinate process was noted to be atelectatic and adherent to the left orbit.  There was also a bony dehiscence of the left lamina papyracea with periorbital fat visible through the layers of periorbita and mucosa.  There were no periorbital dehiscence is.  I began by gently  the uncinate process from the orbit and then resected it in its entirety.  I obtained access into the maxillary sinus and then performed a wide maxillary antrostomy taking care to avoid any damage to the orbit.  I was able to identify the natural ostium of the maxillary sinus and connected it with my antrostomy.  I then injected 1% lidocaine with 1 100,000 parts epinephrine into the sphenopalatine artery distribution.  I proceeded with a total ethmoidectomy I first removing the bulla ethmoidalis and then sequentially resecting the anterior and posterior ethmoid septations were anterior to posterior.  Care was taken to not damage to the orbit or skull base.  The ethmoid cavity was noted to be quite contracted and care was taken to avoid damaging any adjacent structures.  I then used a 30 degree endoscope and curved instrumentation to remove ethmoid septations along the skull base coursing anteriorly until I arrived in the recess.  I meticulously dissected the frontal recess and then gained access into the frontal sinus.  There was no extensive retained hyperdense materials as was seen on the preoperative scan with the tissue at the frontal recess itself was edematous.  I thoroughly irrigated the nose with normal saline and then controlled bleeding points with cautery.  Several Valsalva maneuvers were performed.  There were no bleeding points.  This completed the procedure.  Please note that all counts were correct x 2 and I was present and actively participated in the entire procedure.  Evidence of Infection: No   Complications:  None; patient tolerated the procedure well.    Disposition: PACU - hemodynamically stable.  Condition:  stable             Attending Attestation: I performed the procedure.    Loco Royal V  Phone Number: [phone]

## 2025-06-17 NOTE — ANESTHESIA PROCEDURE NOTES
Airway  Date/Time: 6/17/2025 12:05 PM  Reason: elective    Airway not difficult    Staffing  Performed: DANNI   Authorized by: Serafin Aleman MD    Performed by: HANSEL Jim  Patient location during procedure: OR    Patient Condition  Indications for airway management: anesthesia    Final Airway Details   Preoxygenated: yes  Final airway type: endotracheal airway  Successful airway: ETT  Cuffed: yes   Successful intubation technique: direct laryngoscopy  Adjuncts used in placement: intubating stylet  Endotracheal tube insertion site: oral  Blade: Betty  Blade size: #4  ETT size (mm): 7.5  Cormack-Lehane Classification: grade I - full view of glottis  Placement verified by: chest auscultation and capnometry   Cuff volume (mL): 8  Measured from: lips  ETT to lips (cm): 21  Number of attempts at approach: 1

## 2025-06-17 NOTE — DISCHARGE INSTRUCTIONS
Loco Royal MD, M.Eng.  [facility]  Department of Otolaryngology-Head & Neck Surgery  Division of Rhinology and Endoscopic Skull Base Surgery  [email or web address]    PHONE NUMBERS:    Emergency: Call 911     Urgent Issues/After Hours: Call [phone] and ask to speak to the ENT resident on-call for Otolaryngology/Dr. Royal    For regular, routine issues, feel free to call us at the office    WHAT TO DO     You need to follow-up with me at my outpatient clinic on the date provided to you.    Please call us to change date or time. Please keep in mind that the timing of your post-operative visit may affect the success of your surgery.    Call ASAP if you are experiencing any unexpected problems.    Familiarize yourself with these instructions. These instructions should replace any instructions given to you by the hospital. If you have questions, please call me.    Use the recommended medications and treatments as described.    Do not blow your nose until I say it is OK.     Call me with questions.  Also, please call me the day after surgery to let me know how you are doing.    WHAT TO EXPECT    Nasal Discharge & Bleeding  It is common to have mild bleeding and nasal drainage after nasal & sinus surgery.  After surgery, a “drip pad” may have been placed under your nose.  You may remove this at any point and can replace it if necessary, at your discretion.    Pain & Pressure  It is common to experience mild-to-moderate pain and pressure in your face and around your eyes. The pain usually is worst the first day after surgery but can continue for several days. Use your pain medications as described below. For any severe pain uncontrolled by medications, please contact the office or present to the emergency room.    Fatigue  It is common to feel mild to moderate fatigue for 7-10 days after surgery.    Nasal Congestion and Crusting    It is common to  experience worsened nasal congestion after surgery.  This is due to swelling and crusts.  Crusts are essentially scabs and mucus that build up in the nasal passages after surgery. Crusts eventually resolve.  Usually, I will remove some crusts during your postoperative visit.    Nasal irrigation helps to soften and remove scabs. We will tell you on the day of surgery when you can start irrigation    Nasal irrigation kits are available over the counter in the nasal section.  Common brands include SinuCleanse or NeilMed.    ACTIVITY     First 1-2 days after surgery: Plan to rest, but you may start light activities as tolerated.  Days 2 through 10 following surgery:  Light activity only until 10 days after surgery, gradually increase activity.    No extensive bending over for 7 days after surgery.    No lifting over 20 pounds for one week after surgery. No aerobic exercise until I clear you to do so.    You may shower starting 1 day after surgery    Call my office if you experience any of the following:    · Fever higher than 101 F  · Clear, watery nasal discharge  · Any visual changes or marked swelling around the eyes  · Severe headache or neck stiffness  · Brisk bleeding    NUTRITION     Day of surgery    Drink plenty of water / fluids  Eat light snacks as tolerated  It is common for people to have less of an appetite the day of surgery  Day after surgery: Advance your diet as tolerated    MEDICATION SAFETY TIPS    Use medications only as directed in the amounts specified  Avoid aspirin for 10 days.  Avoid fish oil (omega-3/6) and vitamin E for 1 week.  As your pain lessens, you may replace doses of prescription pain medications with acetaminophen (Tylenol).  However, if prescribed Lortab or Percocet (containing acetaminophen), Do Not take doses of prescription pain medications at the same time as Tylenol because this could cause an overdose of Tylenol.  Do Not drive or operate machinery if taking prescription pain  medications  Read each medication label carefully, ask your pharmacist if you have any questions regarding warnings on the label  Do not measure liquid with a kitchen spoon.  There are pediatric measuring devices available at the pharmacy.  Ask for one when you get your prescription filled.   Store all mediations out of reach of children.  Call the Poison Control Center if you or your child takes too much of any medicine or if your child consumes your medication [phone].    Revised 6/2022

## 2025-06-25 ENCOUNTER — APPOINTMENT (OUTPATIENT)
Dept: OTOLARYNGOLOGY | Facility: CLINIC | Age: 65
End: 2025-06-25
Payer: MEDICARE

## 2025-06-25 VITALS — WEIGHT: 158 LBS | BODY MASS INDEX: 24.8 KG/M2 | HEIGHT: 67 IN

## 2025-06-25 DIAGNOSIS — J34.89 SILENT SINUS SYNDROME: ICD-10-CM

## 2025-06-25 DIAGNOSIS — J32.9 SINUSITIS, UNSPECIFIED CHRONICITY, UNSPECIFIED LOCATION: Primary | ICD-10-CM

## 2025-06-25 DIAGNOSIS — J32.8 OTHER CHRONIC SINUSITIS: ICD-10-CM

## 2025-06-25 DIAGNOSIS — H05.412: ICD-10-CM

## 2025-06-25 PROCEDURE — 1036F TOBACCO NON-USER: CPT | Performed by: OTOLARYNGOLOGY

## 2025-06-25 PROCEDURE — 1159F MED LIST DOCD IN RCRD: CPT | Performed by: OTOLARYNGOLOGY

## 2025-06-25 PROCEDURE — 3008F BODY MASS INDEX DOCD: CPT | Performed by: OTOLARYNGOLOGY

## 2025-06-25 PROCEDURE — 31237 NSL/SINS NDSC SURG BX POLYPC: CPT | Performed by: OTOLARYNGOLOGY

## 2025-06-25 PROCEDURE — 1160F RVW MEDS BY RX/DR IN RCRD: CPT | Performed by: OTOLARYNGOLOGY

## 2025-06-25 PROCEDURE — 99213 OFFICE O/P EST LOW 20 MIN: CPT | Performed by: OTOLARYNGOLOGY

## 2025-06-27 NOTE — PROGRESS NOTES
Referring Provider:  No referring provider defined for this encounter.      History of Present Illness:  History of Present Illness  The patient presents for a postoperative follow-up.    He reports a satisfactory recovery post-surgery. He has been performing nasal rinses four times daily, with the first rinse resulting in the expulsion of bloody water and clots. Subsequent rinses have shown a decrease in blood content, with only occasional clots present. Some clots have been expelled through coughing or spitting. By the third day, the presence of blood in the rinse water was minimal, and by the fourth day, it was absent.     He experienced a burning sensation in his sinuses for the initial two to three days, which has since subsided. He is uncertain if this discomfort was due to the sinus condition or the nasal rinse. He has been using salt packets for the rinse but has not been warming the bottle. Over the past two to three days, he has attempted to perform the rinse twice during one session, with a three to four minute interval between each rinse. He has also been alternating nostrils for the rinse.     He expresses concern about his dizziness, which he had hoped would resolve post-surgery. He reports that the dizziness seems to have improved slightly at times, but at other times, it remains unchanged.     ?  Review of Systems:     Review of symptoms was negative except for those stated including Cardiopulmonary, Genitourinary, Gastrointestinal, Psychological, Sleep pattern, Endocrine, Eyes, Neurologic, Musculoskeletal, Skin, Hematologic/Lymphatic and Allergic/Immunologic.     Medical History:     I have reviewed the patient's updated past medical history, surgical history, family history, social history, as well as current medications and allergies as of 5/6/2025. Changes to these items have been updated and marked as reviewed in the electronic medical record.     Physical Exam:  Physical Exam  General: Patient  doing well overall and is in no apparent distress.  Vital signs: Vitals were not taken for this visit.  Psych: Pleasant affect, and answers questions appropriately.  Head & Face: Symmetric facial movements.  Eyes: Pupils equal, round, reactive. Extraocular movements intact without gaze restrictions or nystagmus. No epiphora.  Ears: External auditory canals are normal. Tympanic membranes are clear. No middle ear effusion is seen. All middle ear landmarks are normal.  Nose: Nasal exam revealed crusting.  Oral Cavity/Oropharynx: Without lesions or masses to visual exam.  Neck: Supple without lymphadenopathy.  Lungs: Non-labored, and without evidence of stridor.  Cardiac: Pulses are strong, well-perfused.  Extremities: Without gross evidence of clubbing, cyanosis, or edema.  Neuro: Cranial nerves II-XII grossly intact; Intact facial movements.     Procedure:  Sinonasal cavity debridement (18509)  Pre-procedure diagnosis: Sinonasal crusting/obstruction  Post-procedure diagnosis:  Sinonasal crusting/obstruction  Indication:  Remove nasal crusting, prevent synechiae    Verbal consent was obtained after informed discussion    Topical anesthetic was applied with a combination of 4% lidocaine spray and oxymetazoline. A 0 and 30-degree endoscope was used throughout the procedure.    Blood clots, debris, eschar and nasal crusting were removed from the middle meatus, maxillary sinus and ethmoid cavity.  A combination of straight and curved suctions, as well as grasping forceps were utilized.      Findings:  I meticulously debrided the maxillary and ethmoid cavity removing crusting, retained blood products, and other materials from the left sinonasal cavity. No evidence of polyposis, mucopurulence, or other findings.     The patient tolerated the procedure well.       Assessment & Plan  1. Postoperative status.  Demonstrating satisfactory progress in postoperative recovery. The surgical procedure was successful, and favorable  long-term outcomes are expected. Advised to continue with the current regimen without modifications. Minor bloody spotting or a small amount of blood in irrigation post-procedure is common. Advised to warm the nasal rinse bottle slightly before use to alleviate any burning sensation.    Follow-up in 1 to 2 weeks.          Loco Royal MD, M.Eng.   of Otolaryngology - Head & Neck Surgery  Division of Rhinology and Endoscopic Skull Base Surgery  [facility]     This medical note was created with the assistance of artificial intelligence (AI) for documentation purposes. The content has been reviewed and confirmed by the healthcare provider for accuracy and completeness. Patient consented to the use of audio recording and use of AI during their visit.

## 2025-07-01 ENCOUNTER — OFFICE VISIT (OUTPATIENT)
Facility: CLINIC | Age: 65
End: 2025-07-01
Payer: MEDICARE

## 2025-07-01 VITALS — WEIGHT: 158 LBS | HEIGHT: 67 IN | BODY MASS INDEX: 24.8 KG/M2

## 2025-07-01 DIAGNOSIS — J34.89 SILENT SINUS SYNDROME: ICD-10-CM

## 2025-07-01 DIAGNOSIS — J32.8 OTHER CHRONIC SINUSITIS: Primary | ICD-10-CM

## 2025-07-01 DIAGNOSIS — H05.412: ICD-10-CM

## 2025-07-01 RX ORDER — AMOXICILLIN AND CLAVULANATE POTASSIUM 875; 125 MG/1; MG/1
1 TABLET, FILM COATED ORAL 2 TIMES DAILY
Qty: 28 TABLET | Refills: 0 | Status: SHIPPED | OUTPATIENT
Start: 2025-07-01 | End: 2025-07-15

## 2025-07-01 RX ORDER — PREDNISONE 10 MG/1
TABLET ORAL
Qty: 30 TABLET | Refills: 0 | Status: SHIPPED | OUTPATIENT
Start: 2025-07-01 | End: 2025-07-11

## 2025-07-01 NOTE — PROGRESS NOTES
History of Present Illness:  The patient presents for a second postoperative follow-up.    He presents today as a same-day add-on after messaging the ENT team after waking up yesterday with left cheek swelling and pressure with 2-3/10 discomfort to that area.  He relates this to a similar experience he had preoperatively nothing on his MRI for this.  He has been tracking his temperature without a significant rise in his temperature.  He denies any vision changes or excess tearing.  ?  Review of Systems:  Review of symptoms was negative except for those stated including Cardiopulmonary, Genitourinary, Gastrointestinal, Psychological, Sleep pattern, Endocrine, Eyes, Neurologic, Musculoskeletal, Skin, Hematologic/Lymphatic and Allergic/Immunologic.     Medical History:     I have reviewed the patient's updated past medical history, surgical history, family history, social history, as well as current medications and allergies as of 07/01/25. Changes to these items have been updated and marked as reviewed in the electronic medical record.     Physical Exam:  General: Patient doing well overall and is in no apparent distress.  Vital signs: Vitals were not taken for this visit.  Psych: Pleasant affect, and answers questions appropriately.  Head & Face: Symmetric facial movements. Mild swelling overlying his left cheek without palpable tenderness or crepitus.   Eyes: Pupils equal, round, reactive. Extraocular movements intact without gaze restrictions or nystagmus. No epiphora.  Ears: External auditory canals are normal. Tympanic membranes are clear. No middle ear effusion is seen. All middle ear landmarks are normal.  Nose: Nasal exam revealed crusting.  Oral Cavity/Oropharynx: Without lesions or masses to visual exam.  Neck: Supple without lymphadenopathy.  Lungs: Non-labored, and without evidence of stridor.  Cardiac: Pulses are strong, well-perfused.  Extremities: Without gross evidence of clubbing, cyanosis, or  edema.  Neuro: Cranial nerves II-XII grossly intact; Intact facial movements.     Procedure:  Sinonasal cavity debridement (11094)  Pre-procedure diagnosis: Sinonasal crusting/obstruction  Post-procedure diagnosis:  Sinonasal crusting/obstruction  Indication:  Remove nasal crusting, prevent synechiae     Verbal consent was obtained after informed discussion     Topical anesthetic was applied with a combination of 4% lidocaine spray and oxymetazoline. A 0 degree endoscope was used throughout the procedure.     Blood crusting and mucous debris was removed with a suction on the right side.      Findings:  I meticulously debrided the left maxillary and ethmoid cavity removing crusting, retained blood products, and other materials from the left sinonasal cavity. Moderate evidence of polypoid edema. No anabella purulence encountered.      The patient tolerated the procedure well.        Assessment & Plan  1. Postoperative status.  Demonstrating satisfactory progress in postoperative recovery now with right sided pressure and mild swelling. Scope does not definitively show any purulence s/p debridement today but will treat the polypoid edema to expedite recovery.     - Rx steroids and augmentin    Follow-up in 1 to 2 weeks.           Loco Royal MD, M.Eng.   of Otolaryngology - Head & Neck Surgery  Division of Rhinology and Endoscopic Skull Base Surgery  [facility]     This medical note was created with the assistance of artificial intelligence (AI) for documentation purposes. The content has been reviewed and confirmed by the healthcare provider for accuracy and completeness. Patient consented to the use of audio recording and use of AI during their visit.

## 2025-07-08 ENCOUNTER — APPOINTMENT (OUTPATIENT)
Facility: CLINIC | Age: 65
End: 2025-07-08
Payer: MEDICARE

## 2025-07-08 VITALS — WEIGHT: 158 LBS | BODY MASS INDEX: 24.75 KG/M2

## 2025-07-08 DIAGNOSIS — H05.412: ICD-10-CM

## 2025-07-08 DIAGNOSIS — J34.89 SILENT SINUS SYNDROME: ICD-10-CM

## 2025-07-08 DIAGNOSIS — J32.8 OTHER CHRONIC SINUSITIS: Primary | ICD-10-CM

## 2025-07-08 PROCEDURE — 1159F MED LIST DOCD IN RCRD: CPT | Performed by: OTOLARYNGOLOGY

## 2025-07-08 PROCEDURE — 1036F TOBACCO NON-USER: CPT | Performed by: OTOLARYNGOLOGY

## 2025-07-08 PROCEDURE — 99213 OFFICE O/P EST LOW 20 MIN: CPT | Performed by: OTOLARYNGOLOGY

## 2025-07-08 PROCEDURE — 31237 NSL/SINS NDSC SURG BX POLYPC: CPT | Performed by: OTOLARYNGOLOGY

## 2025-07-08 NOTE — PROGRESS NOTES
Referring Provider:  No referring provider defined for this encounter.      History of Present Illness:  History of Present Illness  The patient presents for  post-op follow up    He reports a significant reduction in facial swelling, although it has not completely subsided. He has been performing nasal rinses daily and notes that the discharge from his nose was greenish-yellow on Friday but has since become clearer and less voluminous. He has been attempting to sleep on his right side. He has reduced his prednisone intake to 2 tablets today.    He describes a sensation of fluid in his left eye, accompanied by a slight burning sensation. Additionally, he feels as though the left side of his head, extending from his nose to just beyond his ear, is filled with fluid. When riding his bike or jumping up and down, he experiences pain in his teeth and the roof of his mouth on the left side, similar to the feeling of his fillings coming loose. The left front quadrant of his head feels slightly numb, akin to the sensation of water in his ear. He is concerned about potential issues with his left eye beyond misalignment, such as fluid buildup or inflammation. He is seeking advice on the next steps and timelines for his treatment, as well as which provider he should consult for guidance. He is experiencing balance issues and dizziness and is wondering how long he should wait for these symptoms to resolve before taking further action. He is also questioning how long he should continue the nasal rinses.     ?  Review of Systems:     Review of symptoms was negative except for those stated including Cardiopulmonary, Genitourinary, Gastrointestinal, Psychological, Sleep pattern, Endocrine, Eyes, Neurologic, Musculoskeletal, Skin, Hematologic/Lymphatic and Allergic/Immunologic.     Medical History:     I have reviewed the patient's updated past medical history, surgical history, family history, social history, as well as current  medications and allergies as of 5/6/2025. Changes to these items have been updated and marked as reviewed in the electronic medical record.     Physical Exam:  Physical Exam  General: Patient doing well overall and is in no apparent distress.  Vital signs: Vitals were not taken for this visit.  Psych: Pleasant affect, and answers questions appropriately.  Head & Face: Asymmetry in facial appearance.  Eyes: Left eye slightly depressed.  Ears: External auditory canals are normal. Tympanic membranes are clear. No middle ear effusion is seen. All middle ear landmarks are normal.  Nose: Unable to visualize  Oral Cavity/Oropharynx: Without lesions or masses to visual exam.  Neck: Supple without lymphadenopathy.  Lungs: Non-labored, and without evidence of stridor.  Cardiac: Pulses are strong, well-perfused.  Extremities: Without gross evidence of clubbing, cyanosis, or edema.  Neuro: Cranial nerves II-XII grossly intact; Intact facial movements.  Other observations: Nasal endoscopy revealed discolored mucus and reduced swelling inside the nasal cavity. Minor scabbing noted.     Procedure:  Sinonasal cavity debridement (24286)  Pre-procedure diagnosis: Sinonasal crusting/obstruction  Post-procedure diagnosis:  Sinonasal crusting/obstruction  Indication:  Remove nasal crusting, prevent synechiae    Verbal consent was obtained after informed discussion    Topical anesthetic was applied with a combination of 4% lidocaine spray and oxymetazoline. A 0 and 30-degree endoscope was used throughout the procedure.    Blood clots, debris, eschar and nasal crusting were removed from the middle meatus, maxillary sinus and ethmoid cavity.  A combination of straight and curved suctions, as well as grasping forceps were utilized.      Findings:  Meticulously removed crusting and retained blood products from the left sinonasal cavity. No mucopurulence or sign of infection. He tolerated the procedure well.     The patient tolerated the  procedure well.     Assessment & Plan  1. Sinusitis/Silent Sinus Syndrome . S/P ESS  I advised Gene that all of his symptoms are expected following ESS.  His sinonasal exam was very encouraging today. Advised to continue with saline rinses until the next visit in 2 months. Flonase recommended for short-term use over the next month to reduce inflammation in the sinuses. Normal activities, including blowing the nose, can be resumed. A follow-up appointment with Dr. Hogue suggested for 2 months from now to plan the next phase of treatment/orbital floor recon.    Follow up in 2 months with me.           Loco Royal MD, M.Eng.   of Otolaryngology - Head & Neck Surgery  Division of Rhinology and Endoscopic Skull Base Surgery  [facility]     This medical note was created with the assistance of artificial intelligence (AI) for documentation purposes. The content has been reviewed and confirmed by the healthcare provider for accuracy and completeness. Patient consented to the use of audio recording and use of AI during their visit.

## 2025-08-11 ENCOUNTER — APPOINTMENT (OUTPATIENT)
Dept: OTOLARYNGOLOGY | Facility: CLINIC | Age: 65
End: 2025-08-11
Payer: MEDICARE

## 2025-08-20 ENCOUNTER — APPOINTMENT (OUTPATIENT)
Dept: OPHTHALMOLOGY | Facility: CLINIC | Age: 65
End: 2025-08-20
Payer: MEDICARE

## 2025-08-20 DIAGNOSIS — J34.89 SILENT SINUS SYNDROME: Primary | ICD-10-CM

## 2025-08-20 DIAGNOSIS — H25.813 COMBINED FORMS OF AGE-RELATED CATARACT OF BOTH EYES: ICD-10-CM

## 2025-08-20 DIAGNOSIS — H43.813 PVD (POSTERIOR VITREOUS DETACHMENT), BOTH EYES: ICD-10-CM

## 2025-08-20 DIAGNOSIS — H40.003 GLAUCOMA SUSPECT OF BOTH EYES: ICD-10-CM

## 2025-08-20 PROCEDURE — 92133 CPTRZD OPH DX IMG PST SGM ON: CPT | Performed by: OPTOMETRIST

## 2025-08-20 PROCEDURE — 92083 EXTENDED VISUAL FIELD XM: CPT | Performed by: OPTOMETRIST

## 2025-08-20 PROCEDURE — 99214 OFFICE O/P EST MOD 30 MIN: CPT | Performed by: OPTOMETRIST

## 2025-08-20 ASSESSMENT — TONOMETRY
IOP_METHOD: GOLDMANN APPLANATION
OS_IOP_MMHG: 16
OD_IOP_MMHG: 17

## 2025-08-20 ASSESSMENT — CONF VISUAL FIELD
OD_SUPERIOR_TEMPORAL_RESTRICTION: 0
OS_SUPERIOR_NASAL_RESTRICTION: 0
OS_INFERIOR_NASAL_RESTRICTION: 0
OD_SUPERIOR_NASAL_RESTRICTION: 0
OS_INFERIOR_TEMPORAL_RESTRICTION: 0
OD_INFERIOR_NASAL_RESTRICTION: 0
OD_NORMAL: 1
OS_SUPERIOR_TEMPORAL_RESTRICTION: 0
OS_NORMAL: 1
OD_INFERIOR_TEMPORAL_RESTRICTION: 0

## 2025-08-20 ASSESSMENT — CUP TO DISC RATIO
OD_RATIO: 0.6
OS_RATIO: 0.6

## 2025-08-20 ASSESSMENT — VISUAL ACUITY
OS_PH_CC: 20/20-
METHOD: SNELLEN - LINEAR
OS_CC: 20/20
OD_CC: 20/20-3
OS_CC: 20/30-
OD_CC: 20/20

## 2025-08-20 ASSESSMENT — EXTERNAL EXAM - RIGHT EYE: OD_EXAM: NORMAL

## 2025-09-05 ENCOUNTER — OFFICE VISIT (OUTPATIENT)
Dept: SURGERY | Facility: CLINIC | Age: 65
End: 2025-09-05
Payer: MEDICARE

## 2025-09-05 VITALS
HEART RATE: 52 BPM | BODY MASS INDEX: 25.84 KG/M2 | WEIGHT: 165 LBS | DIASTOLIC BLOOD PRESSURE: 75 MMHG | SYSTOLIC BLOOD PRESSURE: 153 MMHG

## 2025-09-05 DIAGNOSIS — R85.613: Primary | Chronic | ICD-10-CM

## 2025-09-05 PROCEDURE — 46600 DIAGNOSTIC ANOSCOPY SPX: CPT | Performed by: NURSE PRACTITIONER

## 2025-09-05 PROCEDURE — 99213 OFFICE O/P EST LOW 20 MIN: CPT | Performed by: NURSE PRACTITIONER

## 2025-09-05 ASSESSMENT — PAIN SCALES - GENERAL: PAINLEVEL_OUTOF10: 0-NO PAIN

## 2025-09-09 ENCOUNTER — APPOINTMENT (OUTPATIENT)
Facility: CLINIC | Age: 65
End: 2025-09-09
Payer: MEDICARE

## 2025-09-30 ENCOUNTER — APPOINTMENT (OUTPATIENT)
Dept: OPHTHALMOLOGY | Facility: CLINIC | Age: 65
End: 2025-09-30
Payer: MEDICARE

## 2026-05-19 ENCOUNTER — APPOINTMENT (OUTPATIENT)
Dept: CARDIOLOGY | Facility: CLINIC | Age: 66
End: 2026-05-19
Payer: MEDICARE

## (undated) DEVICE — REST, HEAD, BAGEL, 9 IN

## (undated) DEVICE — DRAPE, INSTRUMENT, W/POUCH, STERI DRAPE, 7 X 11 IN, DISPOSABLE, STERILE

## (undated) DEVICE — Device

## (undated) DEVICE — COVER, TABLE, 44 X 75 IN, DISPOSABLE, LF, STERILE

## (undated) DEVICE — COVER, CART, 45 X 27 X 48 IN, CLEAR

## (undated) DEVICE — ELECTRODE, ELECTROSURGICAL, COAGULATOR, W/SUCTION, HANDSWITCHING, 8 FR, 6 IN

## (undated) DEVICE — TOWEL, SURGICAL, NEURO, O/R, 16 X 26, BLUE, STERILE

## (undated) DEVICE — DRAPE, FLUID WARMER

## (undated) DEVICE — TUBE, SALEM SUMP, 16 FR X 48IN, ENFIT